# Patient Record
Sex: MALE | Race: WHITE | NOT HISPANIC OR LATINO | Employment: FULL TIME | URBAN - METROPOLITAN AREA
[De-identification: names, ages, dates, MRNs, and addresses within clinical notes are randomized per-mention and may not be internally consistent; named-entity substitution may affect disease eponyms.]

---

## 2017-03-28 ENCOUNTER — HOSPITAL ENCOUNTER (INPATIENT)
Facility: OTHER | Age: 47
LOS: 10 days | Discharge: HOME | DRG: 234 | End: 2017-04-07
Attending: EMERGENCY MEDICINE | Admitting: THORACIC SURGERY (CARDIOTHORACIC VASCULAR SURGERY)
Payer: COMMERCIAL

## 2017-03-28 DIAGNOSIS — I25.10 CORONARY ARTERY DISEASE: ICD-10-CM

## 2017-03-28 DIAGNOSIS — I21.4 NSTEMI (NON-ST ELEVATED MYOCARDIAL INFARCTION): Primary | ICD-10-CM

## 2017-03-28 DIAGNOSIS — E78.00 PURE HYPERCHOLESTEROLEMIA: ICD-10-CM

## 2017-03-28 DIAGNOSIS — I21.4 NON-ST ELEVATION MYOCARDIAL INFARCTION (NSTEMI): ICD-10-CM

## 2017-03-28 DIAGNOSIS — I50.9 HEART FAILURE: ICD-10-CM

## 2017-03-28 DIAGNOSIS — Z95.1 S/P CABG X 2: ICD-10-CM

## 2017-03-28 LAB
ALBUMIN SERPL BCP-MCNC: 4.2 G/DL
ALP SERPL-CCNC: 79 U/L
ALT SERPL W/O P-5'-P-CCNC: 21 U/L
ANION GAP SERPL CALC-SCNC: 10 MMOL/L
AST SERPL-CCNC: 16 U/L
BASOPHILS # BLD AUTO: 0.03 K/UL
BASOPHILS NFR BLD: 0.4 %
BILIRUB SERPL-MCNC: 0.5 MG/DL
BILIRUB UR QL STRIP: NEGATIVE
BUN SERPL-MCNC: 19 MG/DL
CALCIUM SERPL-MCNC: 9.2 MG/DL
CHLORIDE SERPL-SCNC: 106 MMOL/L
CLARITY UR: CLEAR
CO2 SERPL-SCNC: 25 MMOL/L
COLOR UR: YELLOW
CREAT SERPL-MCNC: 1 MG/DL
DIFFERENTIAL METHOD: NORMAL
EOSINOPHIL # BLD AUTO: 0.1 K/UL
EOSINOPHIL NFR BLD: 1.7 %
ERYTHROCYTE [DISTWIDTH] IN BLOOD BY AUTOMATED COUNT: 12.4 %
EST. GFR  (AFRICAN AMERICAN): >60 ML/MIN/1.73 M^2
EST. GFR  (NON AFRICAN AMERICAN): >60 ML/MIN/1.73 M^2
GLUCOSE SERPL-MCNC: 89 MG/DL
GLUCOSE UR QL STRIP: NEGATIVE
HCT VFR BLD AUTO: 46 %
HGB BLD-MCNC: 15.6 G/DL
HGB UR QL STRIP: ABNORMAL
INR PPP: 0.9
KETONES UR QL STRIP: NEGATIVE
LEUKOCYTE ESTERASE UR QL STRIP: NEGATIVE
LYMPHOCYTES # BLD AUTO: 2.8 K/UL
LYMPHOCYTES NFR BLD: 34 %
MCH RBC QN AUTO: 30.6 PG
MCHC RBC AUTO-ENTMCNC: 33.9 %
MCV RBC AUTO: 90 FL
MICROSCOPIC COMMENT: NORMAL
MONOCYTES # BLD AUTO: 0.8 K/UL
MONOCYTES NFR BLD: 9.9 %
NEUTROPHILS # BLD AUTO: 4.4 K/UL
NEUTROPHILS NFR BLD: 53.3 %
NITRITE UR QL STRIP: NEGATIVE
PH UR STRIP: 6 [PH] (ref 5–8)
PLATELET # BLD AUTO: 184 K/UL
PMV BLD AUTO: 10.5 FL
POTASSIUM SERPL-SCNC: 4.4 MMOL/L
PROT SERPL-MCNC: 7.2 G/DL
PROT UR QL STRIP: NEGATIVE
PROTHROMBIN TIME: 10 SEC
RBC # BLD AUTO: 5.1 M/UL
RBC #/AREA URNS HPF: 2 /HPF (ref 0–4)
SODIUM SERPL-SCNC: 141 MMOL/L
SP GR UR STRIP: >=1.03 (ref 1–1.03)
TROPONIN I SERPL DL<=0.01 NG/ML-MCNC: 0.17 NG/ML
URATE SERPL-MCNC: 8.1 MG/DL
URN SPEC COLLECT METH UR: ABNORMAL
UROBILINOGEN UR STRIP-ACNC: NEGATIVE EU/DL
WBC # BLD AUTO: 8.26 K/UL

## 2017-03-28 PROCEDURE — 96372 THER/PROPH/DIAG INJ SC/IM: CPT

## 2017-03-28 PROCEDURE — 93005 ELECTROCARDIOGRAM TRACING: CPT

## 2017-03-28 PROCEDURE — 85025 COMPLETE CBC W/AUTO DIFF WBC: CPT

## 2017-03-28 PROCEDURE — 25000003 PHARM REV CODE 250: Performed by: PHYSICIAN ASSISTANT

## 2017-03-28 PROCEDURE — 93010 ELECTROCARDIOGRAM REPORT: CPT | Mod: ,,, | Performed by: INTERNAL MEDICINE

## 2017-03-28 PROCEDURE — 11000001 HC ACUTE MED/SURG PRIVATE ROOM

## 2017-03-28 PROCEDURE — 99285 EMERGENCY DEPT VISIT HI MDM: CPT | Mod: 25

## 2017-03-28 PROCEDURE — 80053 COMPREHEN METABOLIC PANEL: CPT

## 2017-03-28 PROCEDURE — 85610 PROTHROMBIN TIME: CPT

## 2017-03-28 PROCEDURE — 81000 URINALYSIS NONAUTO W/SCOPE: CPT

## 2017-03-28 PROCEDURE — 84484 ASSAY OF TROPONIN QUANT: CPT

## 2017-03-28 PROCEDURE — 84550 ASSAY OF BLOOD/URIC ACID: CPT

## 2017-03-28 PROCEDURE — 36000 PLACE NEEDLE IN VEIN: CPT

## 2017-03-28 RX ORDER — METOPROLOL TARTRATE 25 MG/1
25 TABLET, FILM COATED ORAL 2 TIMES DAILY
Status: DISCONTINUED | OUTPATIENT
Start: 2017-03-28 | End: 2017-04-03

## 2017-03-28 RX ORDER — NITROGLYCERIN 80 MG/1
1 PATCH TRANSDERMAL DAILY
Status: DISCONTINUED | OUTPATIENT
Start: 2017-03-29 | End: 2017-04-03

## 2017-03-28 RX ORDER — NITROGLYCERIN 0.4 MG/1
0.4 TABLET SUBLINGUAL
Status: COMPLETED | OUTPATIENT
Start: 2017-03-28 | End: 2017-03-29

## 2017-03-28 RX ORDER — CLOPIDOGREL 300 MG/1
600 TABLET, FILM COATED ORAL
Status: COMPLETED | OUTPATIENT
Start: 2017-03-28 | End: 2017-03-29

## 2017-03-28 RX ORDER — ASPIRIN 325 MG
325 TABLET ORAL
Status: COMPLETED | OUTPATIENT
Start: 2017-03-28 | End: 2017-03-28

## 2017-03-28 RX ORDER — ENOXAPARIN SODIUM 100 MG/ML
1 INJECTION SUBCUTANEOUS
Status: COMPLETED | OUTPATIENT
Start: 2017-03-28 | End: 2017-03-29

## 2017-03-28 RX ADMIN — ASPIRIN 325 MG ORAL TABLET 325 MG: 325 PILL ORAL at 08:03

## 2017-03-28 NOTE — IP AVS SNAPSHOT
[facility]  [address]  [facility] [address]  Phone: [phone]           Patient Discharge Instructions   Our goal is to set you up for success. This packet includes information on your condition, medications, and your home care.  It will help you care for yourself to prevent having to return to the hospital.     Please ask your nurse if you have any questions.      There are many details to remember when preparing to leave the hospital. Here is what you will need to do:    1. Take your medicine. If you are prescribed medications, review your Medication List on the following pages. You may have new medications to  at the pharmacy and others that you'll need to stop taking. Review the instructions for how and when to take your medications. Talk with your doctor or nurses if you are unsure of what to do.     2. Go to your follow-up appointments. Specific follow-up information is listed in the following pages. Your may be contacted by a nurse or clinical provider about future appointments. Be sure we have all of the phone numbers to reach you. Please contact your provider's office if you are unable to make an appointment.     3. Watch for warning signs. Your doctor or nurse will give you detailed warning signs to watch for and when to call for assistance. These instructions may also include educational information about your condition. If you experience any of warning signs to your health, call your doctor.           [facility]  Unless otherwise directed by your provider, please   contact [facility], our nurse care line   that is available for 24/7 assistance.     [phone] (toll-free)     Registered nurses in the [facility]   provide: appointment scheduling, clinical advisement, health education, and other advisory services.                  ** Verify the list of medication(s) below is accurate and up to date. Carry this with you in case of  emergency. If your medications have changed, please notify your healthcare provider.             Medication List      START taking these medications        Additional Info    Begin Date AM Noon PM Bedtime    allopurinol 100 MG tablet   Commonly known as:  ZYLOPRIM   Quantity:  60 tablet   Refills:  11   Dose:  100 mg    Last time this was given:  100 mg on 4/7/2017  8:26 AM   Instructions:  Take 1 tablet (100 mg total) by mouth once daily.                               aspirin 325 MG EC tablet   Commonly known as:  ECOTRIN   Refills:  0   Dose:  325 mg    Last time this was given:  325 mg on 4/7/2017  8:26 AM   Instructions:  Take 1 tablet (325 mg total) by mouth once daily.                               clopidogrel 75 mg tablet   Commonly known as:  PLAVIX   Quantity:  60 tablet   Refills:  11   Dose:  75 mg    Last time this was given:  75 mg on 4/7/2017  8:26 AM   Instructions:  Take 1 tablet (75 mg total) by mouth once daily.                               docusate sodium 50 MG capsule   Commonly known as:  COLACE   Refills:  0   Dose:  50 mg    Last time this was given:  50 mg on 4/7/2017  5:39 AM   Instructions:  Take 1 capsule (50 mg total) by mouth 2 (two) times daily as needed for Constipation.                            ezetimibe 10 mg tablet   Commonly known as:  ZETIA   Quantity:  60 tablet   Refills:  2   Dose:  10 mg    Last time this was given:  10 mg on 4/7/2017  8:26 AM   Instructions:  Take 1 tablet (10 mg total) by mouth once daily.                               furosemide 20 MG tablet   Commonly known as:  LASIX   Quantity:  60 tablet   Refills:  11   Dose:  20 mg   Comments:  Take one tablet by mouth twice a day for one week then decrease to one tablet daily    Instructions:  Take 1 tablet (20 mg total) by mouth 2 (two) times daily.                                  metoprolol tartrate 50 MG tablet   Commonly known as:  LOPRESSOR   Quantity:  60 tablet   Refills:  11   Dose:  50 mg    Last time  this was given:  50 mg on 4/7/2017  5:35 AM   Instructions:  Take 1 tablet (50 mg total) by mouth 2 (two) times daily.                                  oxycodone 10 mg Tab immediate release tablet   Commonly known as:  ROXICODONE   Quantity:  60 tablet   Refills:  0   Dose:  10 mg    Last time this was given:  15 mg on 4/6/2017 12:55 AM   Instructions:  Take 1 tablet (10 mg total) by mouth every 4 (four) hours as needed.                            potassium chloride SA 20 MEQ tablet   Commonly known as:  K-DUR,KLOR-CON   Quantity:  60 tablet   Refills:  11   Dose:  20 mEq   Comments:  Take one tablet by mouth twice a day for one week then decrease to one tablet daily    Last time this was given:  20 mEq on 4/7/2017  8:26 AM   Instructions:  Take 1 tablet (20 mEq total) by mouth 2 (two) times daily.                                    CONTINUE taking these medications        Additional Info    Begin Date AM Noon PM Bedtime    rosuvastatin 40 MG Tab   Commonly known as:  CRESTOR   Refills:  0   Dose:  40 mg    Last time this was given:  40 mg on 4/7/2017  8:26 AM   Instructions:  Take 40 mg by mouth once daily.                                    Where to Get Your Medications      These medications were sent to [facility] [address] [address]     Phone:  [phone]     allopurinol 100 MG tablet    clopidogrel 75 mg tablet    ezetimibe 10 mg tablet    furosemide 20 MG tablet    metoprolol tartrate 50 MG tablet    potassium chloride SA 20 MEQ tablet         You can get these medications from any pharmacy     Bring a paper prescription for each of these medications     oxycodone 10 mg Tab immediate release tablet       You don't need a prescription for these medications     aspirin 325 MG EC tablet    docusate sodium 50 MG capsule                  Please bring to all follow up appointments:    1. A copy of your discharge  "instructions.  2. All medicines you are currently taking in their original bottles.  3. Identification and insurance card.    Please arrive 15 minutes ahead of scheduled appointment time.    Please call 24 hours in advance if you must reschedule your appointment and/or time.        Your Scheduled Appointments     Apr 12, 2017  9:45 AM CDT   Diagnostic Xray with [facility] [id number] 485 LB LIMIT   [facility]    [address]  [facility] [address]   [phone]            Apr 12, 2017 10:15 AM CDT   EKG with EKG, APPT   [facility] - EKG ([facility] )    [address]  [place] [id number] [phone]            Apr 12, 2017 10:45 AM CDT   Post OP with Enrrique Allen MD   [facility] - Cardiovascular Surg ([facility] )    [address]  [place] [id number] [phone]                  Primary Diagnosis     Your primary diagnosis was:  History Of Coronary Artery Bypass Graft      Admission Information     Date & Time Provider Department CSN    3/28/2017  8:03 PM Enrrique Allen MD [facility] [id number]      Care Providers     Provider Role Specialty Primary office phone    Enrrique Allen MD Attending Provider Cardiothoracic Surgery [phone]    Michelle Francisco MD Surgeon  Cardiology [phone]    Enrrique Allen MD Surgeon  Cardiothoracic Surgery [phone]      Your Vitals Were     BP Pulse Temp Resp Height Weight    108/63 (BP Location: Left arm, Patient Position: Sitting, BP Method: Automatic) 90 98.4 °F (36.9 °C) (Oral) 18 6' 2" (1.88 m) 101.2 kg (223 lb 1.7 oz)    SpO2 BMI             97% 28.65 kg/m2         Recent Lab Values     No lab values to display.      Allergies as of 4/7/2017        Reactions    Morpholine Analogues Other (See Comments)      Advance Directives     An advance directive is a document which, in the event you are no longer able to make decisions for yourself, " tells your healthcare team what kind of treatment you do or do not want to receive, or who you would like to make those decisions for you.  If you do not currently have an advance directive, [facility] encourages you to create one.  For more information call:  [phone] ([phone]), [phone] ([phone]),  or log on to [email or web address].        Smoking Cessation     If you would like to quit smoking:   You may be eligible for free services if you are a [place] resident and started smoking cigarettes before September 1, 1988.  Call the Smoking Cessation Trust ([facility]) toll free at [phone] or [phone].   Call 6-799-QUIT-NOW if you do not meet the above criteria.   Contact us via email: [email or web address]   View our website for more information: [email or web address]        Language Assistance Services     ATTENTION: Language assistance services are available, free of charge. Please call [phone].      ATENCIÓN: Si habla español, tiene a black disposición servicios gratuitos de asistencia lingüística. Llame al [phone].     CHÚ Ý: N?u b?n nói Ti?ng Vi?t, có các d?ch v? h? tr? ngôn ng? mi?n phí dành cho b?n. G?i s? [phone].        [facility] Sign-Up     Activating your [facility] account is as easy as 1-2-3!     1) Visit [email or web address], select Sign Up Now, enter this activation code and your date of birth, then select Next.  [email or web address]  Expires: 5/20/2017  1:27 PM      2) Create a username and password to use when you visit [facility] in the future and select a security question in case you lose your password and select Next.    3) Enter your e-mail address and click Sign Up!    Additional Information  If you have questions, please e-mail [email or web address] or call [phone] to talk to our [facility] staff. Remember, [facility] is NOT to be used for urgent needs. For medical emergencies, dial 911.          [facility] complies with  applicable Federal civil rights laws and does not discriminate on the basis of race, color, national origin, age, disability, or sex.

## 2017-03-29 PROBLEM — E79.0 HYPERURICEMIA: Status: ACTIVE | Noted: 2017-03-29

## 2017-03-29 PROBLEM — Z72.0 TOBACCO USE: Status: ACTIVE | Noted: 2017-03-29

## 2017-03-29 PROBLEM — N20.0 NEPHROLITHIASIS: Status: ACTIVE | Noted: 2017-03-29

## 2017-03-29 PROBLEM — Z82.49 FAMILY HISTORY OF ISCHEMIC HEART DISEASE: Status: ACTIVE | Noted: 2017-03-29

## 2017-03-29 PROBLEM — E78.00 HYPERCHOLESTEROLEMIA: Status: ACTIVE | Noted: 2017-03-29

## 2017-03-29 PROBLEM — I25.10 CORONARY ARTERY DISEASE: Status: ACTIVE | Noted: 2017-03-29

## 2017-03-29 LAB
CHOLEST/HDLC SERPL: 9.1 {RATIO}
CORONARY STENOSIS: ABNORMAL
DIASTOLIC DYSFUNCTION: NO
GLOBAL PERICARDIAL EFFUSION: NORMAL
HDL/CHOLESTEROL RATIO: 11 %
HDLC SERPL-MCNC: 381 MG/DL
HDLC SERPL-MCNC: 42 MG/DL
LDLC SERPL CALC-MCNC: 313.4 MG/DL
NONHDLC SERPL-MCNC: 339 MG/DL
RETIRED EF AND QEF - SEE NOTES: 50 (ref 55–65)
TRIGL SERPL-MCNC: 128 MG/DL
TROPONIN I SERPL DL<=0.01 NG/ML-MCNC: 0.13 NG/ML
TROPONIN I SERPL DL<=0.01 NG/ML-MCNC: 0.15 NG/ML

## 2017-03-29 PROCEDURE — 11000001 HC ACUTE MED/SURG PRIVATE ROOM

## 2017-03-29 PROCEDURE — 63600175 PHARM REV CODE 636 W HCPCS: Performed by: PHYSICIAN ASSISTANT

## 2017-03-29 PROCEDURE — 80061 LIPID PANEL: CPT

## 2017-03-29 PROCEDURE — 63600175 PHARM REV CODE 636 W HCPCS

## 2017-03-29 PROCEDURE — 84484 ASSAY OF TROPONIN QUANT: CPT | Mod: 91

## 2017-03-29 PROCEDURE — 25000003 PHARM REV CODE 250

## 2017-03-29 PROCEDURE — 27201224 CATH LAB PROCEDURE

## 2017-03-29 PROCEDURE — 25000003 PHARM REV CODE 250: Performed by: INTERNAL MEDICINE

## 2017-03-29 PROCEDURE — 25000003 PHARM REV CODE 250: Performed by: PHYSICIAN ASSISTANT

## 2017-03-29 PROCEDURE — 84484 ASSAY OF TROPONIN QUANT: CPT

## 2017-03-29 PROCEDURE — 25500020 PHARM REV CODE 255

## 2017-03-29 PROCEDURE — B2111ZZ FLUOROSCOPY OF MULTIPLE CORONARY ARTERIES USING LOW OSMOLAR CONTRAST: ICD-10-PCS | Performed by: INTERNAL MEDICINE

## 2017-03-29 PROCEDURE — 93306 TTE W/DOPPLER COMPLETE: CPT

## 2017-03-29 PROCEDURE — 4A023N7 MEASUREMENT OF CARDIAC SAMPLING AND PRESSURE, LEFT HEART, PERCUTANEOUS APPROACH: ICD-10-PCS | Performed by: INTERNAL MEDICINE

## 2017-03-29 PROCEDURE — 36415 COLL VENOUS BLD VENIPUNCTURE: CPT

## 2017-03-29 PROCEDURE — B2151ZZ FLUOROSCOPY OF LEFT HEART USING LOW OSMOLAR CONTRAST: ICD-10-PCS | Performed by: INTERNAL MEDICINE

## 2017-03-29 RX ORDER — NITROGLYCERIN 0.4 MG/1
TABLET SUBLINGUAL
Status: COMPLETED
Start: 2017-03-29 | End: 2017-03-29

## 2017-03-29 RX ORDER — HYDROCODONE BITARTRATE AND ACETAMINOPHEN 5; 325 MG/1; MG/1
1 TABLET ORAL EVERY 4 HOURS PRN
Status: DISCONTINUED | OUTPATIENT
Start: 2017-03-29 | End: 2017-03-30

## 2017-03-29 RX ORDER — ACETAMINOPHEN 325 MG/1
650 TABLET ORAL EVERY 4 HOURS PRN
Status: DISCONTINUED | OUTPATIENT
Start: 2017-03-29 | End: 2017-03-30

## 2017-03-29 RX ORDER — ROSUVASTATIN CALCIUM 40 MG/1
40 TABLET, COATED ORAL DAILY
COMMUNITY

## 2017-03-29 RX ORDER — SODIUM CHLORIDE 9 MG/ML
INJECTION, SOLUTION INTRAVENOUS CONTINUOUS
Status: DISCONTINUED | OUTPATIENT
Start: 2017-03-29 | End: 2017-03-29

## 2017-03-29 RX ORDER — DIPHENHYDRAMINE HCL 25 MG
25 CAPSULE ORAL
Status: DISCONTINUED | OUTPATIENT
Start: 2017-03-29 | End: 2017-03-29

## 2017-03-29 RX ORDER — SODIUM CHLORIDE 9 MG/ML
INJECTION, SOLUTION INTRAVENOUS CONTINUOUS
Status: DISCONTINUED | OUTPATIENT
Start: 2017-03-29 | End: 2017-03-30

## 2017-03-29 RX ORDER — ZOLPIDEM TARTRATE 5 MG/1
5 TABLET ORAL NIGHTLY PRN
Status: DISCONTINUED | OUTPATIENT
Start: 2017-03-29 | End: 2017-04-03

## 2017-03-29 RX ORDER — ROSUVASTATIN CALCIUM 20 MG/1
40 TABLET, COATED ORAL DAILY
Status: DISCONTINUED | OUTPATIENT
Start: 2017-03-29 | End: 2017-04-07 | Stop reason: HOSPADM

## 2017-03-29 RX ORDER — CLOPIDOGREL BISULFATE 75 MG/1
75 TABLET ORAL DAILY
Status: DISCONTINUED | OUTPATIENT
Start: 2017-03-30 | End: 2017-03-29

## 2017-03-29 RX ORDER — ASPIRIN 81 MG/1
81 TABLET ORAL DAILY
Status: DISCONTINUED | OUTPATIENT
Start: 2017-03-29 | End: 2017-04-03

## 2017-03-29 RX ORDER — NITROGLYCERIN 0.4 MG/1
0.4 TABLET SUBLINGUAL EVERY 5 MIN PRN
Status: DISCONTINUED | OUTPATIENT
Start: 2017-03-29 | End: 2017-03-30

## 2017-03-29 RX ORDER — SODIUM CHLORIDE 0.9 % (FLUSH) 0.9 %
3 SYRINGE (ML) INJECTION EVERY 8 HOURS
Status: DISCONTINUED | OUTPATIENT
Start: 2017-03-29 | End: 2017-03-31

## 2017-03-29 RX ADMIN — HYDROCODONE BITARTATE AND ACETAMINOPHEN 1 TABLET: 5; 325 TABLET ORAL at 09:03

## 2017-03-29 RX ADMIN — METOPROLOL TARTRATE 25 MG: 25 TABLET, FILM COATED ORAL at 08:03

## 2017-03-29 RX ADMIN — NITROGLYCERIN 0.4 MG: 0.4 TABLET SUBLINGUAL at 06:03

## 2017-03-29 RX ADMIN — NITROGLYCERIN 0.4 MG: 0.4 TABLET SUBLINGUAL at 12:03

## 2017-03-29 RX ADMIN — CLOPIDOGREL BISULFATE 600 MG: 300 TABLET, FILM COATED ORAL at 12:03

## 2017-03-29 RX ADMIN — ASPIRIN 81 MG: 81 TABLET, COATED ORAL at 08:03

## 2017-03-29 RX ADMIN — ROSUVASTATIN CALCIUM 40 MG: 20 TABLET, FILM COATED ORAL at 08:03

## 2017-03-29 RX ADMIN — ENOXAPARIN SODIUM 100 MG: 100 INJECTION SUBCUTANEOUS at 12:03

## 2017-03-29 RX ADMIN — SODIUM CHLORIDE: 0.9 INJECTION, SOLUTION INTRAVENOUS at 08:03

## 2017-03-29 RX ADMIN — METOPROLOL TARTRATE 25 MG: 25 TABLET, FILM COATED ORAL at 09:03

## 2017-03-29 RX ADMIN — SODIUM CHLORIDE: 0.9 INJECTION, SOLUTION INTRAVENOUS at 09:03

## 2017-03-29 RX ADMIN — NITROGLYCERIN 0.4 MG: 0.4 TABLET SUBLINGUAL at 10:03

## 2017-03-29 RX ADMIN — METOPROLOL TARTRATE 25 MG: 25 TABLET, FILM COATED ORAL at 01:03

## 2017-03-29 RX ADMIN — NITROGLYCERIN 1 PATCH: 0.4 PATCH TRANSDERMAL at 08:03

## 2017-03-29 RX ADMIN — SODIUM CHLORIDE, PRESERVATIVE FREE 3 ML: 5 INJECTION INTRAVENOUS at 09:03

## 2017-03-29 NOTE — PLAN OF CARE
Problem: Patient Care Overview  Goal: Plan of Care Review  Outcome: Ongoing (interventions implemented as appropriate)  Patient remains free from injury or falls. Vital signs stable throughout night on room air. Positions self independently. Pain managed with IV and PO medications. Telemetry maintained. Bed in low locked position and call light within reach. Will continue to monitor.

## 2017-03-29 NOTE — ED TRIAGE NOTES
Pt reports to ED c/o 8/10 intermittent midsternal chest pain x 2-3 weeks worsening with exertion with radiation to upper extremities; denies SOB, diaphoresis, N/V/D, fevers, chills. Pt recently seen in ED for kidney stones, denies any worsening flank pain, dysuria/hematuria.

## 2017-03-29 NOTE — PLAN OF CARE
Problem: Patient Care Overview  Goal: Plan of Care Review  Outcome: Ongoing (interventions implemented as appropriate)  Pt arrived from ED via wheelchair. No c/o pain. Independent to bathroom. Telemetry initiated. Patient remains free from injury or falls. Vital signs stable throughout night on room air. Pt refused SCDs; education provided. Bed in low locked position and call light within reach. Will continue to monitor.

## 2017-03-29 NOTE — OR NURSING
Patient c/o chest pain.pressure  2-3/10.  Dr. Francisco notified. Orders received for NTG SL and initiated.

## 2017-03-29 NOTE — BRIEF OP NOTE
3/29/2017: [facility]: Cath: LAD: Prox 95%. Mid: Occlusion. Wrap around. LCX: OM1 90%. RCA: Distal 80% segmental. LV: Anteroseptal mild hypokinesia. EF 50%.    Michelle Francisco M.D.

## 2017-03-29 NOTE — SUBJECTIVE & OBJECTIVE
Past Medical History:   Diagnosis Date    Gout     Kidney stones        Past Surgical History:   Procedure Laterality Date    LITHOTRIPSY         Review of patient's allergies indicates:   Allergen Reactions    Morpholine analogues Other (See Comments)       No current facility-administered medications on file prior to encounter.      No current outpatient prescriptions on file prior to encounter.     Family History     None        Social History Main Topics    Smoking status: Current Every Day Smoker     Packs/day: 0.25     Start date: 1/1/1990    Smokeless tobacco: Never Used    Alcohol use Yes    Drug use: No    Sexual activity: Not on file     Review of Systems   Constitution: Negative for chills, fever, weakness and malaise/fatigue.   HENT: Negative for headaches and nosebleeds.    Eyes: Negative for double vision, vision loss in left eye and vision loss in right eye.   Cardiovascular: Positive for chest pain. Negative for claudication, dyspnea on exertion, irregular heartbeat, leg swelling, near-syncope, orthopnea, palpitations, paroxysmal nocturnal dyspnea and syncope.   Respiratory: Negative for cough, hemoptysis, shortness of breath and wheezing.    Endocrine: Negative for cold intolerance and heat intolerance.   Hematologic/Lymphatic: Negative for bleeding problem. Does not bruise/bleed easily.   Skin: Negative for color change and rash.   Musculoskeletal: Negative for back pain, falls, muscle weakness and myalgias.   Gastrointestinal: Negative for heartburn, hematemesis, hematochezia, hemorrhoids, jaundice, melena, nausea and vomiting.   Genitourinary: Negative for dysuria and hematuria.   Neurological: Negative for dizziness, focal weakness, light-headedness, loss of balance, numbness and vertigo.   Psychiatric/Behavioral: Negative for altered mental status, depression and memory loss. The patient is not nervous/anxious.    Allergic/Immunologic: Negative for hives and persistent infections.      Objective:     Vital Signs (Most Recent):  Temp: 97.9 °F (36.6 °C) (03/29/17 0441)  Pulse: (!) 56 (03/29/17 0600)  Resp: 14 (03/29/17 0209)  BP: 122/72 (03/29/17 0441)  SpO2: 98 % (03/29/17 0441) Vital Signs (24h Range):  Temp:  [97.8 °F (36.6 °C)-98.1 °F (36.7 °C)] 97.9 °F (36.6 °C)  Pulse:  [56-89] 56  Resp:  [10-18] 14  SpO2:  [95 %-98 %] 98 %  BP: (119-146)/(67-90) 122/72     Weight: 104.3 kg (230 lb)  Body mass index is 29.53 kg/(m^2).    SpO2: 98 %  O2 Device (Oxygen Therapy): room air    No intake or output data in the 24 hours ending 03/29/17 0710    Lines/Drains/Airways     Peripheral Intravenous Line                 Peripheral IV - Single Lumen 03/28/17 2038 Left Antecubital less than 1 day                Physical Exam   Constitutional: He is oriented to person, place, and time. He appears well-developed and well-nourished.  Non-toxic appearance. No distress.   HENT:   Head: Normocephalic and atraumatic.   Nose: Nose normal.   Eyes: Right eye exhibits no discharge. Left eye exhibits no discharge. Right conjunctiva is not injected. Left conjunctiva is not injected. Right pupil is round. Left pupil is round. Pupils are equal.   Neck: Neck supple. No JVD present. Carotid bruit is not present. No thyromegaly present.   Cardiovascular: Normal rate, regular rhythm, S1 normal and S2 normal.   No extrasystoles are present. PMI is not displaced.  Exam reveals no gallop.    Pulses:       Radial pulses are 2+ on the right side, and 2+ on the left side.        Femoral pulses are 2+ on the right side, and 2+ on the left side.       Dorsalis pedis pulses are 2+ on the right side, and 2+ on the left side.        Posterior tibial pulses are 2+ on the right side, and 2+ on the left side.   Pulmonary/Chest: Effort normal and breath sounds normal.   Abdominal: Soft. Normal appearance. There is no hepatosplenomegaly. There is no tenderness.   Musculoskeletal:        Right ankle: He exhibits no swelling, no ecchymosis and  no deformity.        Left ankle: He exhibits no swelling, no ecchymosis and no deformity.   Lymphadenopathy:        Head (right side): No submandibular adenopathy present.        Head (left side): No submandibular adenopathy present.     He has no cervical adenopathy.   Neurological: He is alert and oriented to person, place, and time. He is not disoriented. No cranial nerve deficit.   Skin: Skin is warm, dry and intact. No rash noted. He is not diaphoretic. No cyanosis. No pallor. Nails show no clubbing.   Psychiatric: He has a normal mood and affect. His speech is normal and behavior is normal. Judgment and thought content normal. Cognition and memory are normal.       Current Medications:     metoprolol tartrate  25 mg Oral BID    nitroGLYCERIN 0.4 MG/HR TD PT24  1 patch Transdermal Daily     Current Laboratory Results:    Recent Results (from the past 24 hour(s))   Urinalysis Clean Catch    Collection Time: 03/28/17  8:12 PM   Result Value Ref Range    Specimen UA Urine, Clean Catch     Color, UA Yellow Yellow, Straw, Yasmin    Appearance, UA Clear Clear    pH, UA 6.0 5.0 - 8.0    Specific Gravity, UA >=1.030 (A) 1.005 - 1.030    Protein, UA Negative Negative    Glucose, UA Negative Negative    Ketones, UA Negative Negative    Bilirubin (UA) Negative Negative    Occult Blood UA 2+ (A) Negative    Nitrite, UA Negative Negative    Urobilinogen, UA Negative <2.0 EU/dL    Leukocytes, UA Negative Negative   Urinalysis Microscopic    Collection Time: 03/28/17  8:12 PM   Result Value Ref Range    RBC, UA 2 0 - 4 /hpf    Microscopic Comment SEE COMMENT    CBC auto differential    Collection Time: 03/28/17  8:43 PM   Result Value Ref Range    WBC 8.26 3.90 - 12.70 K/uL    RBC 5.10 4.60 - 6.20 M/uL    Hemoglobin 15.6 14.0 - 18.0 g/dL    Hematocrit 46.0 40.0 - 54.0 %    MCV 90 82 - 98 fL    MCH 30.6 27.0 - 31.0 pg    MCHC 33.9 32.0 - 36.0 %    RDW 12.4 11.5 - 14.5 %    Platelets 184 150 - 350 K/uL    MPV 10.5 9.2 - 12.9  fL    Gran # 4.4 1.8 - 7.7 K/uL    Lymph # 2.8 1.0 - 4.8 K/uL    Mono # 0.8 0.3 - 1.0 K/uL    Eos # 0.1 0.0 - 0.5 K/uL    Baso # 0.03 0.00 - 0.20 K/uL    Gran% 53.3 38.0 - 73.0 %    Lymph% 34.0 18.0 - 48.0 %    Mono% 9.9 4.0 - 15.0 %    Eosinophil% 1.7 0.0 - 8.0 %    Basophil% 0.4 0.0 - 1.9 %    Differential Method Automated    Comprehensive metabolic panel    Collection Time: 03/28/17  8:43 PM   Result Value Ref Range    Sodium 141 136 - 145 mmol/L    Potassium 4.4 3.5 - 5.1 mmol/L    Chloride 106 95 - 110 mmol/L    CO2 25 23 - 29 mmol/L    Glucose 89 70 - 110 mg/dL    BUN, Bld 19 6 - 20 mg/dL    Creatinine 1.0 0.5 - 1.4 mg/dL    Calcium 9.2 8.7 - 10.5 mg/dL    Total Protein 7.2 6.0 - 8.4 g/dL    Albumin 4.2 3.5 - 5.2 g/dL    Total Bilirubin 0.5 0.1 - 1.0 mg/dL    Alkaline Phosphatase 79 55 - 135 U/L    AST 16 10 - 40 U/L    ALT 21 10 - 44 U/L    Anion Gap 10 8 - 16 mmol/L    eGFR if African American >60 >60 mL/min/1.73 m^2    eGFR if non African American >60 >60 mL/min/1.73 m^2   Protime-INR    Collection Time: 03/28/17  8:43 PM   Result Value Ref Range    Prothrombin Time 10.0 9.0 - 12.5 sec    INR 0.9 0.8 - 1.2   Troponin I    Collection Time: 03/28/17  8:43 PM   Result Value Ref Range    Troponin I 0.171 (H) 0.000 - 0.026 ng/mL   Uric acid    Collection Time: 03/28/17  8:43 PM   Result Value Ref Range    Uric Acid 8.1 (H) 3.4 - 7.0 mg/dL   Troponin I    Collection Time: 03/29/17 12:58 AM   Result Value Ref Range    Troponin I 0.148 (H) 0.000 - 0.026 ng/mL   Troponin I    Collection Time: 03/29/17 12:58 AM   Result Value Ref Range    Troponin I 0.148 (H) 0.000 - 0.026 ng/mL   Troponin I    Collection Time: 03/29/17 12:58 AM   Result Value Ref Range    Troponin I 0.148 (H) 0.000 - 0.026 ng/mL     Current Imaging Results:    Imaging Results         X-Ray Chest PA And Lateral (Final result) Result time:  03/28/17 20:49:52    Final result by Carlo Martinez MD (03/28/17 20:49:52)    Impression:        No  radiographic acute intrathoracic process seen.      Electronically signed by: BRADLY FLORIAN MD, MD  Date:     03/28/17  Time:    20:49     Narrative:    COMPARISON: None    FINDINGS: Two views of the chest. EKG stickers overlie the chest.   Pulmonary vasculature and hilar regions are within normal limits.  The bilateral lungs are well expanded and clear.  No pleural effusion or pneumothorax.  The heart and mediastinal contours are within normal limits for age.  Included osseous structures show mild degenerative change without acute or destructive process seen.              ECG: NSR. Normal ECG.

## 2017-03-29 NOTE — PLAN OF CARE
SW met with pt bedside to complete discharge assessment.  Pt is from [place] and in [place] for convention.  Pt will return home to [place], [place] upon discharge.  No family in town but mother, Sallie [phone], is willing to come to [place] if needed.  No needs identified at this time.     03/29/17 1354   Discharge Assessment   Assessment Type Discharge Planning Assessment   Confirmed/corrected address and phone number on facesheet? Yes   Assessment information obtained from? Patient   Prior to hospitilization cognitive status: Alert/Oriented   Prior to hospitalization functional status: Independent   Current cognitive status: Alert/Oriented   Current Functional Status: Independent   Lives With alone   Able to Return to Prior Arrangements yes   Is patient able to care for self after discharge? Yes   Patient's perception of discharge disposition home or selfcare   Patient currently being followed by outpatient case management? No   Patient currently receives home health services? No   Does the patient currently use HME? No   Patient currently receives private duty nursing? No   Patient currently receives any other outside agency services? No   Do you have any problems affording any of your prescribed medications? No   Is the patient taking medications as prescribed? yes   Do you have any financial concerns preventing you from receiving the healthcare you need? No   Does the patient have transportation to healthcare appointments? Yes   Transportation Available (personal transportation)   On Dialysis? No   Does the patient receive services at the Coumadin Clinic? No   Are there any open cases? No   Discharge Plan A Home   Patient/Family In Agreement With Plan yes

## 2017-03-29 NOTE — H&P
[facility]  Cardiology  History and Physical     Patient Name: Se Denson  MRN: [id number]  Admission Date: 3/28/2017  Code Status: Full Code   Attending Provider: Michelle Francisco MD   Primary Care Physician: Primary Doctor No  Principal Problem:Non-ST elevation myocardial infarction (NSTEMI)    Patient information was obtained from patient and ER records.     Subjective:     Chief Complaint:  Chest Pain    HPI:  45 yo male with hypercholesterolemia who smokes. He has a positive family history for ischemic heart disease in that his mother was having an intervention procedure at age 38. He began experience chest tightness with exertion about 3/10/2017. The pain would come on when on an elliptical machine. Since then progression with chest tightness with minimal exertion and on 3/28/2017 mild chest discomfort at rest. The pain was radiating out to his arms. He received sl NTG in the ER with resolution of pain. Presents to the ER and admitted.    Past Medical History:   Diagnosis Date    Gout     Kidney stones        Past Surgical History:   Procedure Laterality Date    LITHOTRIPSY         Review of patient's allergies indicates:   Allergen Reactions    Morpholine analogues Other (See Comments)       No current facility-administered medications on file prior to encounter.      No current outpatient prescriptions on file prior to encounter.     Family History     None        Social History Main Topics    Smoking status: Current Every Day Smoker     Packs/day: 0.25     Start date: 1/1/1990    Smokeless tobacco: Never Used    Alcohol use Yes    Drug use: No    Sexual activity: Not on file     Review of Systems   Constitution: Negative for chills, fever, weakness and malaise/fatigue.   HENT: Negative for headaches and nosebleeds.    Eyes: Negative for double vision, vision loss in left eye and vision loss in right eye.   Cardiovascular: Positive for chest pain. Negative for  claudication, dyspnea on exertion, irregular heartbeat, leg swelling, near-syncope, orthopnea, palpitations, paroxysmal nocturnal dyspnea and syncope.   Respiratory: Negative for cough, hemoptysis, shortness of breath and wheezing.    Endocrine: Negative for cold intolerance and heat intolerance.   Hematologic/Lymphatic: Negative for bleeding problem. Does not bruise/bleed easily.   Skin: Negative for color change and rash.   Musculoskeletal: Negative for back pain, falls, muscle weakness and myalgias.   Gastrointestinal: Negative for heartburn, hematemesis, hematochezia, hemorrhoids, jaundice, melena, nausea and vomiting.   Genitourinary: Negative for dysuria and hematuria.   Neurological: Negative for dizziness, focal weakness, light-headedness, loss of balance, numbness and vertigo.   Psychiatric/Behavioral: Negative for altered mental status, depression and memory loss. The patient is not nervous/anxious.    Allergic/Immunologic: Negative for hives and persistent infections.     Objective:     Vital Signs (Most Recent):  Temp: 97.9 °F (36.6 °C) (03/29/17 0441)  Pulse: (!) 56 (03/29/17 0600)  Resp: 14 (03/29/17 0209)  BP: 122/72 (03/29/17 0441)  SpO2: 98 % (03/29/17 0441) Vital Signs (24h Range):  Temp:  [97.8 °F (36.6 °C)-98.1 °F (36.7 °C)] 97.9 °F (36.6 °C)  Pulse:  [56-89] 56  Resp:  [10-18] 14  SpO2:  [95 %-98 %] 98 %  BP: (119-146)/(67-90) 122/72     Weight: 104.3 kg (230 lb)  Body mass index is 29.53 kg/(m^2).    SpO2: 98 %  O2 Device (Oxygen Therapy): room air    No intake or output data in the 24 hours ending 03/29/17 0710    Lines/Drains/Airways     Peripheral Intravenous Line                 Peripheral IV - Single Lumen 03/28/17 2038 Left Antecubital less than 1 day                Physical Exam   Constitutional: He is oriented to person, place, and time. He appears well-developed and well-nourished.  Non-toxic appearance. No distress.   HENT:   Head: Normocephalic and atraumatic.   Nose: Nose normal.    Eyes: Right eye exhibits no discharge. Left eye exhibits no discharge. Right conjunctiva is not injected. Left conjunctiva is not injected. Right pupil is round. Left pupil is round. Pupils are equal.   Neck: Neck supple. No JVD present. Carotid bruit is not present. No thyromegaly present.   Cardiovascular: Normal rate, regular rhythm, S1 normal and S2 normal.   No extrasystoles are present. PMI is not displaced.  Exam reveals no gallop.    Pulses:       Radial pulses are 2+ on the right side, and 2+ on the left side.        Femoral pulses are 2+ on the right side, and 2+ on the left side.       Dorsalis pedis pulses are 2+ on the right side, and 2+ on the left side.        Posterior tibial pulses are 2+ on the right side, and 2+ on the left side.   Pulmonary/Chest: Effort normal and breath sounds normal.   Abdominal: Soft. Normal appearance. There is no hepatosplenomegaly. There is no tenderness.   Musculoskeletal:        Right ankle: He exhibits no swelling, no ecchymosis and no deformity.        Left ankle: He exhibits no swelling, no ecchymosis and no deformity.   Lymphadenopathy:        Head (right side): No submandibular adenopathy present.        Head (left side): No submandibular adenopathy present.     He has no cervical adenopathy.   Neurological: He is alert and oriented to person, place, and time. He is not disoriented. No cranial nerve deficit.   Skin: Skin is warm, dry and intact. No rash noted. He is not diaphoretic. No cyanosis. No pallor. Nails show no clubbing.   Psychiatric: He has a normal mood and affect. His speech is normal and behavior is normal. Judgment and thought content normal. Cognition and memory are normal.       Current Medications:     metoprolol tartrate  25 mg Oral BID    nitroGLYCERIN 0.4 MG/HR TD PT24  1 patch Transdermal Daily     Current Laboratory Results:    Recent Results (from the past 24 hour(s))   Urinalysis Clean Catch    Collection Time: 03/28/17  8:12 PM   Result  Value Ref Range    Specimen UA Urine, Clean Catch     Color, UA Yellow Yellow, Straw, Yasmin    Appearance, UA Clear Clear    pH, UA 6.0 5.0 - 8.0    Specific Gravity, UA >=1.030 (A) 1.005 - 1.030    Protein, UA Negative Negative    Glucose, UA Negative Negative    Ketones, UA Negative Negative    Bilirubin (UA) Negative Negative    Occult Blood UA 2+ (A) Negative    Nitrite, UA Negative Negative    Urobilinogen, UA Negative <2.0 EU/dL    Leukocytes, UA Negative Negative   Urinalysis Microscopic    Collection Time: 03/28/17  8:12 PM   Result Value Ref Range    RBC, UA 2 0 - 4 /hpf    Microscopic Comment SEE COMMENT    CBC auto differential    Collection Time: 03/28/17  8:43 PM   Result Value Ref Range    WBC 8.26 3.90 - 12.70 K/uL    RBC 5.10 4.60 - 6.20 M/uL    Hemoglobin 15.6 14.0 - 18.0 g/dL    Hematocrit 46.0 40.0 - 54.0 %    MCV 90 82 - 98 fL    MCH 30.6 27.0 - 31.0 pg    MCHC 33.9 32.0 - 36.0 %    RDW 12.4 11.5 - 14.5 %    Platelets 184 150 - 350 K/uL    MPV 10.5 9.2 - 12.9 fL    Gran # 4.4 1.8 - 7.7 K/uL    Lymph # 2.8 1.0 - 4.8 K/uL    Mono # 0.8 0.3 - 1.0 K/uL    Eos # 0.1 0.0 - 0.5 K/uL    Baso # 0.03 0.00 - 0.20 K/uL    Gran% 53.3 38.0 - 73.0 %    Lymph% 34.0 18.0 - 48.0 %    Mono% 9.9 4.0 - 15.0 %    Eosinophil% 1.7 0.0 - 8.0 %    Basophil% 0.4 0.0 - 1.9 %    Differential Method Automated    Comprehensive metabolic panel    Collection Time: 03/28/17  8:43 PM   Result Value Ref Range    Sodium 141 136 - 145 mmol/L    Potassium 4.4 3.5 - 5.1 mmol/L    Chloride 106 95 - 110 mmol/L    CO2 25 23 - 29 mmol/L    Glucose 89 70 - 110 mg/dL    BUN, Bld 19 6 - 20 mg/dL    Creatinine 1.0 0.5 - 1.4 mg/dL    Calcium 9.2 8.7 - 10.5 mg/dL    Total Protein 7.2 6.0 - 8.4 g/dL    Albumin 4.2 3.5 - 5.2 g/dL    Total Bilirubin 0.5 0.1 - 1.0 mg/dL    Alkaline Phosphatase 79 55 - 135 U/L    AST 16 10 - 40 U/L    ALT 21 10 - 44 U/L    Anion Gap 10 8 - 16 mmol/L    eGFR if African American >60 >60 mL/min/1.73 m^2    eGFR if non  African American >60 >60 mL/min/1.73 m^2   Protime-INR    Collection Time: 03/28/17  8:43 PM   Result Value Ref Range    Prothrombin Time 10.0 9.0 - 12.5 sec    INR 0.9 0.8 - 1.2   Troponin I    Collection Time: 03/28/17  8:43 PM   Result Value Ref Range    Troponin I 0.171 (H) 0.000 - 0.026 ng/mL   Uric acid    Collection Time: 03/28/17  8:43 PM   Result Value Ref Range    Uric Acid 8.1 (H) 3.4 - 7.0 mg/dL   Troponin I    Collection Time: 03/29/17 12:58 AM   Result Value Ref Range    Troponin I 0.148 (H) 0.000 - 0.026 ng/mL   Troponin I    Collection Time: 03/29/17 12:58 AM   Result Value Ref Range    Troponin I 0.148 (H) 0.000 - 0.026 ng/mL   Troponin I    Collection Time: 03/29/17 12:58 AM   Result Value Ref Range    Troponin I 0.148 (H) 0.000 - 0.026 ng/mL     Current Imaging Results:    Imaging Results         X-Ray Chest PA And Lateral (Final result) Result time:  03/28/17 20:49:52    Final result by Bradly Martinez MD (03/28/17 20:49:52)    Impression:        No radiographic acute intrathoracic process seen.      Electronically signed by: BRADLY MARTINEZ MD, MD  Date:     03/28/17  Time:    20:49     Narrative:    COMPARISON: None    FINDINGS: Two views of the chest. EKG stickers overlie the chest.   Pulmonary vasculature and hilar regions are within normal limits.  The bilateral lungs are well expanded and clear.  No pleural effusion or pneumothorax.  The heart and mediastinal contours are within normal limits for age.  Included osseous structures show mild degenerative change without acute or destructive process seen.              ECG: NSR. Normal ECG.    Assessment and Plan:     1. Coronary Artery Disease   3/28/2017: NSTEMI. Troponin 0.2.    Aspirin and clopidogrel.   Metoprolol and NTG patch.   Received enoxaparin.   Cath today.    2. Hypercholesterolemia   Check lipid panel.   Begin atorvastatin 80 mg Q24.    3. Tobacco Use   1990: Began smoking.   Unable to quit.   Strongly advised to quit.    4. Family  History for Ischemic Heart Disease   Mother received coronary stent at age 38.    5. Nephrolithiasis   2016: Diagnosed.   Followed in [place].    6. Hyperuricemia   2016: Diagnosed.   Followed in [place].    The planned procedure was discussed in detail with the patient and the family members present. Risk, benefit and alternatives were reviewed. All questions answered. Consent was then signed.    If further questions or concerns arise the patient was encouraged to contact me prior to the planned procedure.         VTE Risk Mitigation         Ordered     Medium Risk of VTE  Once      03/28/17 2310     Place sequential compression device  Until discontinued      03/28/17 2310          Michelle Francisco MD  Cardiology   [facility]

## 2017-03-29 NOTE — ED PROVIDER NOTES
"Encounter Date: 3/28/2017       History     Chief Complaint   Patient presents with    Chest Pain     pt reports intermittent chest pain for the past 2 weeks; pain radiates into both arms; denies any diaphoresis    Abdominal Pain     reports history of kidney stones with bilateral kidney pain that started also about 2 weeks ago; denies any N/V/D; denies any dysuria/hematuria at this time     Review of patient's allergies indicates:  No Known Allergies  HPI Comments: 46-year-old male with history of gout, kidney stones and hyperlipidemia presents to the emergency department with complaints of intermittent midsternal chest pain that radiates up to both arms.  He describes it as "constricting."  He reports diaphoresis and palpitations when pain is severe.  He states that it is worse with exercise.  He admits that he was recently diagnosed with kidney stones approximately 2 weeks ago.  He denies any nausea, vomiting or diarrhea.  He denies any urinary symptoms.  He states that he is "passing them"  due to increased pain to bilateral flanks.  He states that he is concerned that the pain he is feeling distressed secondary to high uric acid levels.  He states that he had elevated uric acid levels in the past and he had similar pain.  He admits to being a current every day smoker.  He does admit to family history of cardiac disease.    The history is provided by the patient.     Past Medical History:   Diagnosis Date    Gout     Kidney stones      Past Surgical History:   Procedure Laterality Date    LITHOTRIPSY       History reviewed. No pertinent family history.  Social History   Substance Use Topics    Smoking status: Current Every Day Smoker    Smokeless tobacco: None    Alcohol use Yes     Review of Systems   Constitutional: Positive for diaphoresis. Negative for chills and fever.   HENT: Negative for sore throat.    Eyes: Negative for visual disturbance.   Respiratory: Positive for chest tightness. Negative " for shortness of breath.    Cardiovascular: Positive for chest pain and palpitations.   Gastrointestinal: Negative for nausea and vomiting.   Genitourinary: Positive for flank pain. Negative for dysuria.   Musculoskeletal: Positive for arthralgias (arm pain). Negative for back pain, neck pain and neck stiffness.   Skin: Negative for rash.   Neurological: Negative for weakness and numbness.   Hematological: Does not bruise/bleed easily.   Psychiatric/Behavioral: Negative for confusion.       Physical Exam   Initial Vitals   BP Pulse Resp Temp SpO2   03/28/17 1816 03/28/17 1816 03/28/17 1816 03/28/17 1816 03/28/17 1816   130/90 89 18 98.1 °F (36.7 °C) 96 %     Physical Exam    Nursing note and vitals reviewed.  Constitutional: He appears well-developed and well-nourished. He is not diaphoretic.  Non-toxic appearance. No distress.   HENT:   Head: Normocephalic and atraumatic.   Right Ear: External ear normal.   Left Ear: External ear normal.   Nose: Nose normal.   Mouth/Throat: Oropharynx is clear and moist.   Eyes: Conjunctivae, EOM and lids are normal. Pupils are equal, round, and reactive to light. No scleral icterus.   Neck: Normal range of motion and phonation normal. Neck supple. No spinous process tenderness and no muscular tenderness present. Normal range of motion present. No rigidity. Carotid bruit is not present. No JVD present.   Cardiovascular: Normal rate, regular rhythm, normal heart sounds, intact distal pulses and normal pulses. Exam reveals no gallop, no friction rub and no decreased pulses.    No murmur heard.  Pulses:       Radial pulses are 2+ on the right side, and 2+ on the left side.        Dorsalis pedis pulses are 2+ on the right side, and 2+ on the left side.   Pulmonary/Chest: Effort normal and breath sounds normal. No respiratory distress. He has no decreased breath sounds. He has no wheezes. He has no rhonchi. He has no rales.   Abdominal: Soft. Normal appearance and bowel sounds are  normal. There is no tenderness. There is no rebound and no guarding.   Musculoskeletal: Normal range of motion.   No obvious deformities, moving all extremities, normal gait   Neurological: He is alert and oriented to person, place, and time. He has normal strength and normal reflexes. He displays no atrophy. No sensory deficit. He exhibits normal muscle tone. Coordination and gait normal. GCS eye subscore is 4. GCS verbal subscore is 5. GCS motor subscore is 6.   Skin: Skin is warm, dry and intact. No lesion and no rash noted. No erythema.   Psychiatric: He has a normal mood and affect. His speech is normal and behavior is normal. Judgment normal. Cognition and memory are normal.         ED Course   Procedures  Labs Reviewed   URINALYSIS - Abnormal; Notable for the following:        Result Value    Specific Gravity, UA >=1.030 (*)     Occult Blood UA 2+ (*)     All other components within normal limits   TROPONIN I - Abnormal; Notable for the following:     Troponin I 0.171 (*)     All other components within normal limits   URIC ACID - Abnormal; Notable for the following:     Uric Acid 8.1 (*)     All other components within normal limits   URINALYSIS MICROSCOPIC   CBC W/ AUTO DIFFERENTIAL   COMPREHENSIVE METABOLIC PANEL   PROTIME-INR   TROPONIN I   TROPONIN I   TROPONIN I   LIPID PANEL     EKG Readings: (Independently Interpreted)   Initial Reading: No STEMI. Rhythm: Normal Sinus Rhythm. Heart Rate: 90. Ectopy: No Ectopy. Conduction: Normal. ST Segments: Normal ST Segments. T Waves: Normal. Clinical Impression: Normal Sinus Rhythm   Repeat EKG 21:15 no significant change from previous     Imaging Results         X-Ray Chest PA And Lateral (Final result) Result time:  03/28/17 20:49:52    Final result by Bradly Martinez MD (03/28/17 20:49:52)    Impression:        No radiographic acute intrathoracic process seen.      Electronically signed by: BRADLY MARTINEZ MD, MD  Date:     03/28/17  Time:    20:49     Narrative:     COMPARISON: None    FINDINGS: Two views of the chest. EKG stickers overlie the chest.   Pulmonary vasculature and hilar regions are within normal limits.  The bilateral lungs are well expanded and clear.  No pleural effusion or pneumothorax.  The heart and mediastinal contours are within normal limits for age.  Included osseous structures show mild degenerative change without acute or destructive process seen.              X-Rays:   Independently Interpreted Readings:   Chest X-Ray: Normal heart size.  No infiltrates.  No acute abnormalities.     Medical Decision Making:   History:   Old Medical Records: I decided to obtain old medical records.  Old Records Summarized: other records.       <> Summary of Records: None. Patient is from out of town, here on business  Initial Assessment:   46-year-old male with complaints concerning for an STEMI.  Vital signs stable, afebrile, neurovascular intact.  He is alert, healthy and nontoxic appearing.  He is in no apparent distress.  Exam is documented above.  My exam is benign.  Independently Interpreted Test(s):   I have ordered and independently interpreted X-rays - see prior notes.  I have ordered and independently interpreted EKG Reading(s) - see prior notes  Clinical Tests:   Lab Tests: Ordered and Reviewed  The following lab test(s) were unremarkable: CMP, CBC and Urinalysis       <> Summary of Lab: Trop 0.171, uric acid 8.1  Radiological Study: Ordered and Reviewed  Medical Tests: Ordered and Reviewed  ED Management:  Cardiac workup obtained.  Patient has elevated troponin concerning for an STEMI.  EKG was obtained from triage and consistent with normal sinus rhythm.  Repeat EKG obtained by me with no specific change.  Consistent with sinus rhythm with sinus arrhythmia.  No evidence of ischemia on EKG.  11:14 PM discussed with Dr. Menendez, cardiologist on call.  Will admit to his service.  He recommends controlling patient's pain with sublingual nitroglycerin here  as well as Nitropatch.  He also recommends 25 mg of metoprolol twice a day and Plavix 600 mg.  Recommends obtaining cholesterol panel.  Orders placed.  Patient placed in telemetry.  Plan discussed with attending physician who agrees with treatment.  Other:   I have discussed this case with another health care provider.       <> Summary of the Discussion: Mirian, attending ED physician and Gemma Cardiologist on call.   This note was created using Dragon Medical dictation.  There may be typographical errors secondary to dictation.                     ED Course     Clinical Impression:     1. NSTEMI (non-ST elevated myocardial infarction)          Disposition:   Disposition: Admitted  Condition: Serious       Jeanette Haas PA-C  03/28/17 0339

## 2017-03-30 DIAGNOSIS — I25.10 CORONARY ARTERY DISEASE INVOLVING NATIVE CORONARY ARTERY OF NATIVE HEART, ANGINA PRESENCE UNSPECIFIED: Primary | ICD-10-CM

## 2017-03-30 PROBLEM — E78.01 FAMILIAL HYPERCHOLESTEROLEMIA: Status: ACTIVE | Noted: 2017-03-29

## 2017-03-30 LAB
ALBUMIN SERPL BCP-MCNC: 3.6 G/DL
ALP SERPL-CCNC: 66 U/L
ALT SERPL W/O P-5'-P-CCNC: 17 U/L
ANION GAP SERPL CALC-SCNC: 11 MMOL/L
AST SERPL-CCNC: 13 U/L
BASOPHILS # BLD AUTO: 0.03 K/UL
BASOPHILS # BLD AUTO: 0.03 K/UL
BASOPHILS NFR BLD: 0.3 %
BASOPHILS NFR BLD: 0.3 %
BILIRUB SERPL-MCNC: 0.6 MG/DL
BUN SERPL-MCNC: 19 MG/DL
CALCIUM SERPL-MCNC: 8.6 MG/DL
CHLORIDE SERPL-SCNC: 105 MMOL/L
CO2 SERPL-SCNC: 22 MMOL/L
CREAT SERPL-MCNC: 0.9 MG/DL
DIFFERENTIAL METHOD: ABNORMAL
DIFFERENTIAL METHOD: NORMAL
EOSINOPHIL # BLD AUTO: 0.1 K/UL
EOSINOPHIL # BLD AUTO: 0.1 K/UL
EOSINOPHIL NFR BLD: 1.3 %
EOSINOPHIL NFR BLD: 1.3 %
ERYTHROCYTE [DISTWIDTH] IN BLOOD BY AUTOMATED COUNT: 12.5 %
ERYTHROCYTE [DISTWIDTH] IN BLOOD BY AUTOMATED COUNT: 12.7 %
EST. GFR  (AFRICAN AMERICAN): >60 ML/MIN/1.73 M^2
EST. GFR  (NON AFRICAN AMERICAN): >60 ML/MIN/1.73 M^2
GLUCOSE SERPL-MCNC: 67 MG/DL
HCT VFR BLD AUTO: 43.3 %
HCT VFR BLD AUTO: 44.1 %
HGB BLD-MCNC: 14.7 G/DL
HGB BLD-MCNC: 14.8 G/DL
LYMPHOCYTES # BLD AUTO: 2.5 K/UL
LYMPHOCYTES # BLD AUTO: 2.8 K/UL
LYMPHOCYTES NFR BLD: 28.1 %
LYMPHOCYTES NFR BLD: 28.4 %
MCH RBC QN AUTO: 30.4 PG
MCH RBC QN AUTO: 30.7 PG
MCHC RBC AUTO-ENTMCNC: 33.6 %
MCHC RBC AUTO-ENTMCNC: 33.9 %
MCV RBC AUTO: 90 FL
MCV RBC AUTO: 91 FL
MONOCYTES # BLD AUTO: 1 K/UL
MONOCYTES # BLD AUTO: 1.1 K/UL
MONOCYTES NFR BLD: 11.2 %
MONOCYTES NFR BLD: 11.7 %
NEUTROPHILS # BLD AUTO: 5.3 K/UL
NEUTROPHILS # BLD AUTO: 5.6 K/UL
NEUTROPHILS NFR BLD: 58 %
NEUTROPHILS NFR BLD: 59.1 %
PLATELET # BLD AUTO: 176 K/UL
PLATELET # BLD AUTO: 178 K/UL
PMV BLD AUTO: 11.1 FL
PMV BLD AUTO: 11.1 FL
POTASSIUM SERPL-SCNC: 3.8 MMOL/L
PROT SERPL-MCNC: 6.5 G/DL
RBC # BLD AUTO: 4.79 M/UL
RBC # BLD AUTO: 4.87 M/UL
SODIUM SERPL-SCNC: 138 MMOL/L
TROPONIN I SERPL DL<=0.01 NG/ML-MCNC: 0.05 NG/ML
TROPONIN I SERPL DL<=0.01 NG/ML-MCNC: 0.11 NG/ML
TROPONIN I SERPL DL<=0.01 NG/ML-MCNC: 0.12 NG/ML
WBC # BLD AUTO: 8.99 K/UL
WBC # BLD AUTO: 9.68 K/UL

## 2017-03-30 PROCEDURE — 36415 COLL VENOUS BLD VENIPUNCTURE: CPT

## 2017-03-30 PROCEDURE — 25000003 PHARM REV CODE 250: Performed by: PHYSICIAN ASSISTANT

## 2017-03-30 PROCEDURE — 20600001 HC STEP DOWN PRIVATE ROOM

## 2017-03-30 PROCEDURE — 84484 ASSAY OF TROPONIN QUANT: CPT | Mod: 91

## 2017-03-30 PROCEDURE — 63600175 PHARM REV CODE 636 W HCPCS: Performed by: INTERNAL MEDICINE

## 2017-03-30 PROCEDURE — 80053 COMPREHEN METABOLIC PANEL: CPT

## 2017-03-30 PROCEDURE — 85025 COMPLETE CBC W/AUTO DIFF WBC: CPT | Mod: 91

## 2017-03-30 PROCEDURE — 93005 ELECTROCARDIOGRAM TRACING: CPT

## 2017-03-30 PROCEDURE — 84484 ASSAY OF TROPONIN QUANT: CPT

## 2017-03-30 PROCEDURE — 93010 ELECTROCARDIOGRAM REPORT: CPT | Mod: ,,, | Performed by: INTERNAL MEDICINE

## 2017-03-30 PROCEDURE — 25000003 PHARM REV CODE 250: Performed by: INTERNAL MEDICINE

## 2017-03-30 RX ORDER — ALLOPURINOL 100 MG/1
100 TABLET ORAL DAILY
Status: DISCONTINUED | OUTPATIENT
Start: 2017-03-30 | End: 2017-04-07 | Stop reason: HOSPADM

## 2017-03-30 RX ORDER — EZETIMIBE 10 MG/1
10 TABLET ORAL DAILY
Status: DISCONTINUED | OUTPATIENT
Start: 2017-03-30 | End: 2017-04-07 | Stop reason: HOSPADM

## 2017-03-30 RX ORDER — ENOXAPARIN SODIUM 100 MG/ML
40 INJECTION SUBCUTANEOUS EVERY 24 HOURS
Status: DISCONTINUED | OUTPATIENT
Start: 2017-03-30 | End: 2017-03-31

## 2017-03-30 RX ORDER — SODIUM CHLORIDE 0.9 % (FLUSH) 0.9 %
3 SYRINGE (ML) INJECTION EVERY 8 HOURS
Status: DISCONTINUED | OUTPATIENT
Start: 2017-03-31 | End: 2017-04-07

## 2017-03-30 RX ORDER — NITROGLYCERIN 0.4 MG/1
0.4 TABLET SUBLINGUAL EVERY 5 MIN PRN
Status: COMPLETED | OUTPATIENT
Start: 2017-03-30 | End: 2017-03-31

## 2017-03-30 RX ORDER — ONDANSETRON 8 MG/1
8 TABLET, ORALLY DISINTEGRATING ORAL EVERY 8 HOURS PRN
Status: DISCONTINUED | OUTPATIENT
Start: 2017-03-31 | End: 2017-04-03

## 2017-03-30 RX ADMIN — NITROGLYCERIN 1 PATCH: 0.4 PATCH TRANSDERMAL at 09:03

## 2017-03-30 RX ADMIN — NITROGLYCERIN 0.4 MG: 0.4 TABLET SUBLINGUAL at 10:03

## 2017-03-30 RX ADMIN — ENOXAPARIN SODIUM 40 MG: 100 INJECTION SUBCUTANEOUS at 11:03

## 2017-03-30 RX ADMIN — ALLOPURINOL 100 MG: 100 TABLET ORAL at 11:03

## 2017-03-30 RX ADMIN — METOPROLOL TARTRATE 25 MG: 25 TABLET, FILM COATED ORAL at 10:03

## 2017-03-30 RX ADMIN — EZETIMIBE 10 MG: 10 TABLET ORAL at 11:03

## 2017-03-30 RX ADMIN — ASPIRIN 81 MG: 81 TABLET, COATED ORAL at 09:03

## 2017-03-30 RX ADMIN — METOPROLOL TARTRATE 25 MG: 25 TABLET, FILM COATED ORAL at 09:03

## 2017-03-30 RX ADMIN — ROSUVASTATIN CALCIUM 40 MG: 20 TABLET, FILM COATED ORAL at 09:03

## 2017-03-30 NOTE — PROGRESS NOTES
SHARRI spoke to Abbie, house supervisor and notified of transfer to [facility], Abbie was aware, pt already on board, has accepting physican, waiting on bed.  SHARRI informed Dr. Francisco, waiting for bed.

## 2017-03-30 NOTE — PROGRESS NOTES
[facility]  Cardiology  Progress Note    Patient Name: Se Denson  MRN: [id number]  Admission Date: 3/28/2017  Hospital Length of Stay: 2 days  Code Status: Full Code   Attending Physician: Michelle Francisco MD   Primary Care Physician: Primary Doctor No  Expected Discharge Date:   Principal Problem:Coronary artery disease    Subjective:     Brief HPI:    47 yo male with hypercholesterolemia who smokes. He has a positive family history for ischemic heart disease in that his mother was having an interventional procedure at age 38. He began experience chest tightness with exertion about 3/10/2017. The pain would come on when on an elliptical machine. Since then progression with chest tightness with minimal exertion and on 3/28/2017 mild chest discomfort at rest. The pain was radiating into his arms. He received sl NTG in the ER with resolution of pain. Admitted.    Hospital Course:    3/29/2017: [facility] [place]: Cath: LAD: Prox 95%. Mid: Occlusion. Wrap around. LCX: OM1 90%. RCA: Distal 80% segmental. LV: Anteroseptal mild hypokinesia. EF 50%.    Interval History:     Maybe a very subtle discomfort in chest that he has felt for weeks.    Review of Systems   Constitution: Negative for chills, fever, weakness and malaise/fatigue.   HENT: Negative for headaches and nosebleeds.    Eyes: Negative for double vision, vision loss in left eye and vision loss in right eye.   Cardiovascular: Negative for chest pain, claudication, dyspnea on exertion, irregular heartbeat, leg swelling, near-syncope, orthopnea, palpitations, paroxysmal nocturnal dyspnea and syncope.   Respiratory: Negative for cough, hemoptysis, shortness of breath and wheezing.    Endocrine: Negative for cold intolerance and heat intolerance.   Hematologic/Lymphatic: Negative for bleeding problem. Does not bruise/bleed easily.   Skin: Negative for color change and rash.   Musculoskeletal: Negative for back pain, falls, muscle weakness and myalgias.    Gastrointestinal: Negative for heartburn, hematemesis, hematochezia, hemorrhoids, jaundice, melena, nausea and vomiting.   Genitourinary: Negative for dysuria and hematuria.   Neurological: Negative for dizziness, focal weakness, light-headedness, loss of balance, numbness and vertigo.   Psychiatric/Behavioral: Negative for altered mental status, depression and memory loss. The patient is not nervous/anxious.    Allergic/Immunologic: Negative for hives and persistent infections.     Objective:     Vital Signs (Most Recent):  Temp: 98.1 °F (36.7 °C) (03/30/17 0715)  Pulse: (!) 55 (03/30/17 0800)  Resp: 16 (03/30/17 0715)  BP: (!) 97/48 (03/30/17 0715)  SpO2: 95 % (03/30/17 0715) Vital Signs (24h Range):  Temp:  [98.1 °F (36.7 °C)-98.7 °F (37.1 °C)] 98.1 °F (36.7 °C)  Pulse:  [50-86] 55  Resp:  [16-17] 16  SpO2:  [95 %-99 %] 95 %  BP: ()/(48-71) 97/48     Weight: 104.3 kg (230 lb)  Body mass index is 29.53 kg/(m^2).    SpO2: 95 %  O2 Device (Oxygen Therapy): room air      Intake/Output Summary (Last 24 hours) at 03/30/17 1004  Last data filed at 03/30/17 0341   Gross per 24 hour   Intake              665 ml   Output                0 ml   Net              665 ml       Lines/Drains/Airways     Peripheral Intravenous Line                 Peripheral IV - Single Lumen 03/28/17 2038 Left Antecubital 1 day                Physical Exam   Constitutional: He is oriented to person, place, and time. He appears well-developed and well-nourished.  Non-toxic appearance. No distress.   HENT:   Head: Normocephalic and atraumatic.   Nose: Nose normal.   Eyes: Right eye exhibits no discharge. Left eye exhibits no discharge. Right conjunctiva is not injected. Left conjunctiva is not injected. Right pupil is round. Left pupil is round. Pupils are equal.   Neck: Neck supple. No JVD present. Carotid bruit is not present. No thyromegaly present.   Cardiovascular: Normal rate, regular rhythm, S1 normal and S2 normal.   No  extrasystoles are present. PMI is not displaced.  Exam reveals no gallop.    Pulses:       Radial pulses are 2+ on the right side, and 2+ on the left side.        Femoral pulses are 2+ on the right side, and 2+ on the left side.       Dorsalis pedis pulses are 2+ on the right side, and 2+ on the left side.        Posterior tibial pulses are 2+ on the right side, and 2+ on the left side.   Pulmonary/Chest: Effort normal and breath sounds normal.   Abdominal: Soft. Normal appearance. There is no hepatosplenomegaly. There is no tenderness.   Musculoskeletal:        Right hip: He exhibits no swelling.        Right ankle: He exhibits no swelling, no ecchymosis and no deformity.        Left ankle: He exhibits no swelling, no ecchymosis and no deformity.   Lymphadenopathy:        Head (right side): No submandibular adenopathy present.        Head (left side): No submandibular adenopathy present.     He has no cervical adenopathy.   Neurological: He is alert and oriented to person, place, and time. He is not disoriented. No cranial nerve deficit.   Skin: Skin is warm, dry and intact. No rash noted. He is not diaphoretic. No cyanosis. Nails show no clubbing.   Psychiatric: He has a normal mood and affect. His speech is normal and behavior is normal. Judgment and thought content normal. Cognition and memory are normal.     Current Medications:     aspirin  81 mg Oral Daily    metoprolol tartrate  25 mg Oral BID    nitroGLYCERIN 0.4 MG/HR TD PT24  1 patch Transdermal Daily    rosuvastatin  40 mg Oral Daily    sodium chloride 0.9%  3 mL Intravenous Q8H     Current Laboratory Results:    Recent Results (from the past 24 hour(s))   2D echo with color flow doppler    Collection Time: 03/29/17 11:30 AM   Result Value Ref Range    EF 50 55 - 65    Diastolic Dysfunction No     Pericardial Effusion NONE    Troponin I    Collection Time: 03/29/17 12:24 PM   Result Value Ref Range    Troponin I 0.127 (H) 0.000 - 0.026 ng/mL   Cath  lab procedure    Collection Time: 03/29/17  3:00 PM   Result Value Ref Range    Coronary Stenosis >= 50% (A)    Troponin I    Collection Time: 03/30/17 12:22 AM   Result Value Ref Range    Troponin I 0.112 (H) 0.000 - 0.026 ng/mL   CBC auto differential    Collection Time: 03/30/17 12:22 AM   Result Value Ref Range    WBC 8.99 3.90 - 12.70 K/uL    RBC 4.87 4.60 - 6.20 M/uL    Hemoglobin 14.8 14.0 - 18.0 g/dL    Hematocrit 44.1 40.0 - 54.0 %    MCV 91 82 - 98 fL    MCH 30.4 27.0 - 31.0 pg    MCHC 33.6 32.0 - 36.0 %    RDW 12.7 11.5 - 14.5 %    Platelets 176 150 - 350 K/uL    MPV 11.1 9.2 - 12.9 fL    Gran # 5.3 1.8 - 7.7 K/uL    Lymph # 2.5 1.0 - 4.8 K/uL    Mono # 1.0 0.3 - 1.0 K/uL    Eos # 0.1 0.0 - 0.5 K/uL    Baso # 0.03 0.00 - 0.20 K/uL    Gran% 59.1 38.0 - 73.0 %    Lymph% 28.1 18.0 - 48.0 %    Mono% 11.2 4.0 - 15.0 %    Eosinophil% 1.3 0.0 - 8.0 %    Basophil% 0.3 0.0 - 1.9 %    Differential Method Automated    Comprehensive metabolic panel    Collection Time: 03/30/17  6:00 AM   Result Value Ref Range    Sodium 138 136 - 145 mmol/L    Potassium 3.8 3.5 - 5.1 mmol/L    Chloride 105 95 - 110 mmol/L    CO2 22 (L) 23 - 29 mmol/L    Glucose 67 (L) 70 - 110 mg/dL    BUN, Bld 19 6 - 20 mg/dL    Creatinine 0.9 0.5 - 1.4 mg/dL    Calcium 8.6 (L) 8.7 - 10.5 mg/dL    Total Protein 6.5 6.0 - 8.4 g/dL    Albumin 3.6 3.5 - 5.2 g/dL    Total Bilirubin 0.6 0.1 - 1.0 mg/dL    Alkaline Phosphatase 66 55 - 135 U/L    AST 13 10 - 40 U/L    ALT 17 10 - 44 U/L    Anion Gap 11 8 - 16 mmol/L    eGFR if African American >60 >60 mL/min/1.73 m^2    eGFR if non African American >60 >60 mL/min/1.73 m^2   Troponin I    Collection Time: 03/30/17  6:00 AM   Result Value Ref Range    Troponin I 0.120 (H) 0.000 - 0.026 ng/mL     Current Imaging Results:    Imaging Results         X-Ray Chest PA And Lateral (Final result) Result time:  03/28/17 20:49:52    Final result by Carlo Martinez MD (03/28/17 20:49:52)    Impression:        No  radiographic acute intrathoracic process seen.      Electronically signed by: BRADLY FLORIAN MD, MD  Date:     03/28/17  Time:    20:49     Narrative:    COMPARISON: None    FINDINGS: Two views of the chest. EKG stickers overlie the chest.   Pulmonary vasculature and hilar regions are within normal limits.  The bilateral lungs are well expanded and clear.  No pleural effusion or pneumothorax.  The heart and mediastinal contours are within normal limits for age.  Included osseous structures show mild degenerative change without acute or destructive process seen.            3/29/2017: Echo:    TEST DESCRIPTION   Technical Quality: This is a technically adequate study.     Aorta: The aortic root is normal in size, measuring 2.5 cm at sinotubular junction and 3.1 cm at Sinuses of Valsalva. The proximal ascending aorta is normal in size, measuring 2.9 cm across.     Left Atrium: The left atrial volume index is normal, measuring 28.96 cc/m2.     Left Ventricle: The left ventricle is normal in size, with an end-diastolic diameter of 4.1 cm, and an end-systolic diameter of 2.7 cm. Wall thickness is mildly increased, with the septum measuring 1.3 cm and the posterior wall measuring 1.2 cm across.   Relative wall thickness was increased at 0.59, and the LV mass index was 92.5 g/m2 consistent with concentric remodeling. The apex is mildly hypokinetic.   The following segments were mildly hypokinetic: apical anterior wall, mid anterior wall.  Global left ventricular systolic function appears low normal to mildly depressed. Visually estimated ejection fraction is 50-55%. The LV Doppler derived stroke volume equals 73.0 ccs.   The E/e'(lat) is 7, consistent with normal diastolic function.     Right Atrium: The right atrium is normal in size, measuring 4.1 cm in length and 3.5 cm in width in the apical view.     Right Ventricle: The right ventricle is normal in size measuring 3.5 cm at the base in the apical right ventricle-focused  view. Global right ventricular systolic function appears normal. Tricuspid annular plane systolic excursion (TAPSE) is 1.6 cm.   Tissue Doppler-derived tricuspid annular peak systolic velocity (S prime) is .1 cm/s.     Aortic Valve:  Aortic valve is normal in structure with normal leaflet mobility.     Mitral Valve:  Mitral valve is normal in structure with normal leaflet mobility.     Tricuspid Valve:  Tricuspid valve is normal in structure with normal leaflet mobility.     Pulmonary Valve:  Pulmonary valve is normal in structure with normal leaflet mobility.     Pericardium: There is no evidence of pericardial effusion.      IVC: IVC is normal in size and collapses > 50% with a sniff, suggesting normal right atrial pressure of 3 mmHg.     Intracavitary: There is no evidence of intracavitary mass or thrombus. There is no evidence of vegetation.     CONCLUSIONS     1 - Concentric remodeling.     2 - Low normal to mildly depressed left ventricular systolic function (EF 50-55%).     3 - Anteroseptal/apical WMA.     This document has been electronically    SIGNED BY: Michelle Francisco MD On: 03/29/2017 18:53    3/29/2017: Cath:    D. Hemodynamic Results     LVEDP (Pre): 8 mmHg  LVEDP (Post): 10 mmHg  Ejection Fraction: 50%  Global LV Function: normal  Wall Motion: mildly hypokinetic in the anteroapical wall    E. Angiographic Results     Diagnostic:          Patient has a right dominant coronary artery.        - Left Main Coronary Artery:             The LM is normal. There is FAYE 3 flow.     - Left Anterior Descending Artery:             The proximal LAD has a 90% stenosis. There is FAYE 2 flow.             The mid LAD has chronic total occlusion. There is FAYE 0 flow. LAD wrap around. Fills late from RCA.     - Left Circumflex Artery:             The LCX has luminal irregularities. There is FAYE 3 flow.     - OM1:             The proximal OM1 has a 90% stenosis. There is FAYE 3 flow. Large branching vessel.     -  Right Coronary Artery:             The distal RCA has a 80% stenosis. There is FAYE 3 flow. The remaining portion of the vessel has luminal irregularities.                     Lesion Details:   The length is 40mm.      Assessment and Plan:     Problem List:    Active Diagnoses:    Diagnosis Date Noted POA    PRINCIPAL PROBLEM:  Coronary artery disease [I25.10] 03/29/2017 Yes    Non-ST elevation myocardial infarction (NSTEMI) [I21.4] 03/28/2017 Yes    Familial hypercholesterolemia [E78.01] 03/29/2017 Yes    Tobacco use [Z72.0] 03/29/2017 Yes    Family history of ischemic heart disease [Z82.49] 03/29/2017 Not Applicable    Nephrolithiasis [N20.0] 03/29/2017 Yes    Hyperuricemia [E79.0] 03/29/2017 Yes      Problems Resolved During this Admission:    Diagnosis Date Noted Date Resolved POA     Assessment and Plan:    1. Coronary Artery Disease              3/28/2017: NSTEMI. Troponin 0.2.    3/29/2017: [facility] [place]: Cath: LAD: Prox 95%. Mid: Occlusion. Wrap around. LCX: OM1 90%. RCA: Distal 80% segmental. LV: Anteroseptal mild hypokinesia. EF 50%.   3/29/2017: Echo: Normal left ventricular size with low normal systolic function. EF 50-55%. Anteroseptal/apical WMA. Mild LVH.   Metoprolol and NTG patch.              On aspirin 81 mg Q24.   3/28/2017: Recieved clopidogrel loading dose of 600 mg.   Think he is best off with CABG.              Should stay in hospital until CABG.   Begin enoxaparin 40 mg Q24.   Transfer to [facility] [place] today.     2. Familial Hypercholesterolemia              3/29/2017: Chol 381. HDL 42. . .   Consistent with familial hypercholesterolemia.   On rosuvastatin 40 mg Q24 but off last four weeks so above panel done off treatment.   Add ezetimide 10 mg Q24.   Should most certainly be on PCSK9 inhibitor as well long term.   Discussed having his children tested.     3. Tobacco Use              1990: Began smoking.              Unable to quit.              Strongly advised to quit.     4. Family  History for Ischemic Heart Disease              Mother received coronary stent at age 38.     5. Nephrolithiasis              2016: Diagnosed.              Followed in [place].     6. Hyperuricemia              2016: Diagnosed.              Followed in [place].   3/29/2017: Uric acid 8.1.   Begin allopurinol 100 mg Q24.    VTE Risk Mitigation         Ordered     Medium Risk of VTE  Once      03/28/17 2310     Place sequential compression device  Until discontinued      03/28/17 2310          Michelle Francisco MD  Cardiology  [facility]

## 2017-03-30 NOTE — PROGRESS NOTES
Received call from transfer center.  Pt going to room 303A, cardiology step down unit.  Call report to [phone], ext [phone].  Transfer center will set up transport via [facility].  Dr. Francisco notified.  Pt notified of impending transfer.

## 2017-03-30 NOTE — PLAN OF CARE
Problem: Patient Care Overview  Goal: Plan of Care Review  Outcome: Ongoing (interventions implemented as appropriate)  Patient remains free from injury or falls. Vital signs stable throughout night on room air. Positions self independently and up to bathroom after 6 hours post procedure. Neuro checks intact. No bleeding or c/o pain to site. Pain managed with PO and SL medications for flank pain and chest tightness. Telemetry maintained. Colleague visited and brought pt belongings to bedside. Pt updated on transfer plan. Bed in low locked position and call light within reach. Will continue to monitor.

## 2017-03-31 ENCOUNTER — APPOINTMENT (OUTPATIENT)
Dept: CARDIOLOGY | Facility: CLINIC | Age: 47
DRG: 234 | End: 2017-03-31
Attending: EMERGENCY MEDICINE
Payer: COMMERCIAL

## 2017-03-31 ENCOUNTER — ANESTHESIA EVENT (OUTPATIENT)
Dept: SURGERY | Facility: HOSPITAL | Age: 47
DRG: 234 | End: 2017-03-31
Payer: COMMERCIAL

## 2017-03-31 LAB
ANION GAP SERPL CALC-SCNC: 6 MMOL/L
BASOPHILS # BLD AUTO: 0.04 K/UL
BASOPHILS NFR BLD: 0.6 %
BUN SERPL-MCNC: 16 MG/DL
CALCIUM SERPL-MCNC: 8.7 MG/DL
CHLORIDE SERPL-SCNC: 109 MMOL/L
CO2 SERPL-SCNC: 24 MMOL/L
CREAT SERPL-MCNC: 0.9 MG/DL
DIFFERENTIAL METHOD: NORMAL
EOSINOPHIL # BLD AUTO: 0.2 K/UL
EOSINOPHIL NFR BLD: 2.2 %
ERYTHROCYTE [DISTWIDTH] IN BLOOD BY AUTOMATED COUNT: 12.2 %
EST. GFR  (AFRICAN AMERICAN): >60 ML/MIN/1.73 M^2
EST. GFR  (NON AFRICAN AMERICAN): >60 ML/MIN/1.73 M^2
GLUCOSE SERPL-MCNC: 96 MG/DL
HCT VFR BLD AUTO: 41 %
HGB BLD-MCNC: 14.1 G/DL
INTERNAL CAROTID STENOSIS: NORMAL
LYMPHOCYTES # BLD AUTO: 2.4 K/UL
LYMPHOCYTES NFR BLD: 35.7 %
MAGNESIUM SERPL-MCNC: 2.2 MG/DL
MCH RBC QN AUTO: 29.9 PG
MCHC RBC AUTO-ENTMCNC: 34.4 %
MCV RBC AUTO: 87 FL
MONOCYTES # BLD AUTO: 0.8 K/UL
MONOCYTES NFR BLD: 11.7 %
NEUTROPHILS # BLD AUTO: 3.4 K/UL
NEUTROPHILS NFR BLD: 49.2 %
PHOSPHATE SERPL-MCNC: 3.8 MG/DL
PLATELET # BLD AUTO: 155 K/UL
PMV BLD AUTO: 10.1 FL
POTASSIUM SERPL-SCNC: 4.1 MMOL/L
RBC # BLD AUTO: 4.72 M/UL
SODIUM SERPL-SCNC: 139 MMOL/L
WBC # BLD AUTO: 6.84 K/UL

## 2017-03-31 PROCEDURE — 25000003 PHARM REV CODE 250: Performed by: PHYSICIAN ASSISTANT

## 2017-03-31 PROCEDURE — 99406 BEHAV CHNG SMOKING 3-10 MIN: CPT

## 2017-03-31 PROCEDURE — 25000003 PHARM REV CODE 250: Performed by: INTERNAL MEDICINE

## 2017-03-31 PROCEDURE — 99900035 HC TECH TIME PER 15 MIN (STAT)

## 2017-03-31 PROCEDURE — 36415 COLL VENOUS BLD VENIPUNCTURE: CPT

## 2017-03-31 PROCEDURE — 93880 EXTRACRANIAL BILAT STUDY: CPT

## 2017-03-31 PROCEDURE — 25000003 PHARM REV CODE 250: Performed by: SURGERY

## 2017-03-31 PROCEDURE — 20600001 HC STEP DOWN PRIVATE ROOM

## 2017-03-31 PROCEDURE — 85025 COMPLETE CBC W/AUTO DIFF WBC: CPT

## 2017-03-31 PROCEDURE — 93880 EXTRACRANIAL BILAT STUDY: CPT | Mod: 26,,, | Performed by: INTERNAL MEDICINE

## 2017-03-31 PROCEDURE — 83735 ASSAY OF MAGNESIUM: CPT

## 2017-03-31 PROCEDURE — 80048 BASIC METABOLIC PNL TOTAL CA: CPT

## 2017-03-31 PROCEDURE — 84100 ASSAY OF PHOSPHORUS: CPT

## 2017-03-31 RX ORDER — NITROGLYCERIN 0.4 MG/1
0.4 TABLET SUBLINGUAL EVERY 5 MIN PRN
Status: DISCONTINUED | OUTPATIENT
Start: 2017-03-31 | End: 2017-04-03

## 2017-03-31 RX ADMIN — Medication 3 ML: at 09:03

## 2017-03-31 RX ADMIN — NITROGLYCERIN 1 PATCH: 0.4 PATCH TRANSDERMAL at 08:03

## 2017-03-31 RX ADMIN — EZETIMIBE 10 MG: 10 TABLET ORAL at 08:03

## 2017-03-31 RX ADMIN — ASPIRIN 81 MG: 81 TABLET, COATED ORAL at 08:03

## 2017-03-31 RX ADMIN — NITROGLYCERIN 0.4 MG: 0.4 TABLET SUBLINGUAL at 07:03

## 2017-03-31 RX ADMIN — METOPROLOL TARTRATE 25 MG: 25 TABLET, FILM COATED ORAL at 09:03

## 2017-03-31 RX ADMIN — ALLOPURINOL 100 MG: 100 TABLET ORAL at 08:03

## 2017-03-31 RX ADMIN — ZOLPIDEM TARTRATE 5 MG: 5 TABLET, FILM COATED ORAL at 09:03

## 2017-03-31 RX ADMIN — Medication 3 ML: at 06:03

## 2017-03-31 RX ADMIN — ROSUVASTATIN CALCIUM 40 MG: 20 TABLET, FILM COATED ORAL at 08:03

## 2017-03-31 RX ADMIN — NITROGLYCERIN 0.4 MG: 0.4 TABLET SUBLINGUAL at 02:03

## 2017-03-31 RX ADMIN — METOPROLOL TARTRATE 25 MG: 25 TABLET, FILM COATED ORAL at 08:03

## 2017-03-31 NOTE — PLAN OF CARE
Problem: Patient Care Overview  Goal: Plan of Care Review  Outcome: Ongoing (interventions implemented as appropriate)  No significant events occurred during the night. Pt remained free from falls/trauma/injury. VSS. Denies any CP, SOB, palpitations, dizziness, pain and discomfort. Turning/repositioning independently in bed. Pt here awaiting a CABG planned for Monday. Plan of care reviewed with patient and all questions answered, verbalizes understanding. No acute distress noted. Will continue to monitor.

## 2017-03-31 NOTE — PLAN OF CARE
CM attempt to see pt. In shower at this time. Dc planning assessment done per [facility] CM. Plan for surgery on Monday.

## 2017-03-31 NOTE — PLAN OF CARE
Problem: Patient Care Overview  Goal: Plan of Care Review  Outcome: Ongoing (interventions implemented as appropriate)  Plan of care discussed with patient. Patient is free of fall/trauma/injury. Denies SOB. Chest pain reported, relieved with two nitro. Vitals stable. All questions addressed. Will continue to monitor.

## 2017-03-31 NOTE — PROGRESS NOTES
Patient Consulted by CTTS:     The following was discussed by the Tobacco Treatment Specialist:  ? Relevance of Quitting  ? Risk to Health  ? Long Term Risk  ? Risk for Others  ? Rewards of Quitting  ? Motivation Intervention to Quit

## 2017-03-31 NOTE — PROGRESS NOTES
Patient complaining of chest pain 3/10, says it feels like a pressure in his chest and a slight headache. Ama, NP notified. 0.4mg of nitro sublingual given. /70 map 83. No ECG changes.

## 2017-03-31 NOTE — PROGRESS NOTES
Second 0.4mg tablet of nitro given, chest pain fully relieved. /65 map 78. Will continue to monitor.

## 2017-03-31 NOTE — ANESTHESIA PREPROCEDURE EVALUATION
03/31/2017  Se Denson is a 46 y.o., male.    Pre-operative evaluation for Procedure(s) (LRB):  AORTOCORONARY BYPASS-CABG/CABGX3 (N/A)  HARVEST-VEIN-ENDOVASCULAR (N/A)    Se Denson is a 46 y.o. male w/ h/o tobacco abuse (1/2ppd x 20y), angina and NSTEMI w/ multivessel CAD being evaluated for the above procedure.     VSS on RA.     LDA:  18g L AC     Prev airway: none in system    Drips:   None currently    Patient Active Problem List   Diagnosis    Non-ST elevation myocardial infarction (NSTEMI)    Familial hypercholesterolemia    Tobacco use    Family history of ischemic heart disease    Nephrolithiasis    Hyperuricemia    Coronary artery disease       Review of patient's allergies indicates:   Allergen Reactions    Morpholine analogues Other (See Comments)        No current facility-administered medications on file prior to encounter.      No current outpatient prescriptions on file prior to encounter.       Past Surgical History:   Procedure Laterality Date    LITHOTRIPSY         Social History     Social History    Marital status:      Spouse name: N/A    Number of children: N/A    Years of education: N/A     Occupational History    Not on file.     Social History Main Topics    Smoking status: Current Every Day Smoker     Packs/day: 0.25     Start date: 1/1/1990    Smokeless tobacco: Never Used    Alcohol use Yes    Drug use: No    Sexual activity: Not on file     Other Topics Concern    Not on file     Social History Narrative         Vital Signs Range (Last 24H):  Temp:  [36.5 °C (97.7 °F)-36.8 °C (98.3 °F)]   Pulse:  [58-95]   Resp:  [16-18]   BP: ()/(53-78)   SpO2:  [95 %-98 %]       CBC:   Recent Labs      03/30/17   0600  03/31/17   0610   WBC  9.68  6.84   RBC  4.79  4.72   HGB  14.7  14.1   HCT  43.3  41.0   PLT  178  155   MCV  90  87   MCH   30.7  29.9   MCHC  33.9  34.4       CMP:   Recent Labs      03/28/17   2043  03/30/17   0600  03/31/17   0610   NA  141  138  139   K  4.4  3.8  4.1   CL  106  105  109   CO2  25  22*  24   BUN  19  19  16   CREATININE  1.0  0.9  0.9   GLU  89  67*  96   MG   --    --   2.2   PHOS   --    --   3.8   CALCIUM  9.2  8.6*  8.7   ALBUMIN  4.2  3.6   --    PROT  7.2  6.5   --    ALKPHOS  79  66   --    ALT  21  17   --    AST  16  13   --    BILITOT  0.5  0.6   --        INR  Recent Labs      03/28/17 2043   INR  0.9           Diagnostic Studies:  Cath lab:  Diagnostic:          Patient has a right dominant coronary artery.        - Left Main Coronary Artery:             The LM is normal. There is FAYE 3 flow.     - Left Anterior Descending Artery:             The proximal LAD has a 90% stenosis. There is FAYE 2 flow.             The mid LAD has chronic total occlusion. There is FAYE 0 flow. LAD wrap around. Fills late from RCA.     - Left Circumflex Artery:             The LCX has luminal irregularities. There is FAYE 3 flow.     - OM1:             The proximal OM1 has a 90% stenosis. There is FAYE 3 flow. Large branching vessel.     - Right Coronary Artery:             The distal RCA has a 80% stenosis. There is FAYE 3 flow. The remaining portion of the vessel has luminal irregularities.                     Lesion Details:   The length is 40mm.    EKG:  Vent. Rate : 090 BPM     Atrial Rate : 090 BPM     P-R Int : 166 ms          QRS Dur : 092 ms      QT Int : 366 ms       P-R-T Axes : 052 085 055 degrees     QTc Int : 447 ms    Normal sinus rhythm  Nonspecific ST abnormality  Abnormal ECG    Confirmed by Michelle Francisco MD ([id number]) on 3/29/2017 12:43:40 PM    2D Echo:  CONCLUSIONS     1 - Concentric remodeling.     2 - Low normal to mildly depressed left ventricular systolic function (EF 50-55%).     3 - Anteroseptal/apical WMA.     This document has been electronically    SIGNED BY: Michelle Francisco MD On: 03/29/2017  18:53        [facility] Anesthesia Evaluation         Review of Systems  Anesthesia Hx:  History of prior surgery of interest to airway management or planning: Denies Family Hx of Anesthesia complications.  Denies Personal Hx of Anesthesia complications.   Social:  Smoker, Social Alcohol Use    EENT/Dental:EENT/Dental Normal   Cardiovascular:   Past MI CAD   Angina    Pulmonary:   Shortness of breath    Renal/:   Chronic Renal Disease renal calculi    Neurological:  Neurology Normal    Endocrine:  Endocrine Normal        Physical Exam  General:  Well nourished    Airway/Jaw/Neck:  Airway Findings: Mouth Opening: Normal General Airway Assessment: Adult  Mallampati: II  Improves to I with phonation.  TM Distance: Normal, at least 6 cm  Jaw/Neck Findings:  Neck ROM: Normal ROM     Eyes/Ears/Nose:  Eyes/Ears/Nose Findings:    Dental:  Dental Findings: In tact   Chest/Lungs:  Chest/Lungs Findings: Normal Respiratory Rate     Heart/Vascular:  Heart Findings: Rate: Normal  Rhythm: Regular Rhythm        Mental Status:  Mental Status Findings:  Cooperative, Alert and Oriented         Anesthesia Plan  Type of Anesthesia, risks & benefits discussed:  Anesthesia Type:  general  Patient's Preference:   Intra-op Monitoring Plan: arterial line, central line and standard ASA monitors  Intra-op Monitoring Plan Comments:   Post Op Pain Control Plan:   Post Op Pain Control Plan Comments:   Induction:   IV  Beta Blocker:  Patient is on a Beta-Blocker and has received one dose within the past 24 hours (No further documentation required).       Informed Consent: Patient understands risks and agrees with Anesthesia plan.  Questions answered. Anesthesia consent signed with patient.  ASA Score: 4     Day of Surgery Review of History & Physical:    H&P update referred to the surgeon.     Anesthesia Plan Notes: A-line, central line, KASSIDY, intubation in post-op/ICU period.  No swallowing difficulties        Ready For Surgery From Anesthesia  Perspective.

## 2017-03-31 NOTE — H&P
[facility]  Cardiothoracic Surgery  History and Physical    Subjective:     Patient is a 46 y.o. male who was admitted to [facility] with NSTEMI.  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  He presented to [facility] and workup revealed NSTEMI.  He reports that over the past several weeks, he has noticed an increase in episodes of chest tightness with exercise.  He first noticed it with more intense exercise on an elliptical; however, this has progressed to having the same chest tightness with walking up a flight of stairs or carrying groceries.  He has lost 70 lbs over the past 2 years through diet and exercise; however, his angina has kept him from exercising for the past 3 weeks.  His chest pain on admission was relieved by NTG and EKG showed no significant ST segment changes.  Troponin was elevated to a peak of 0.148.  He denies shortness of breath, PND, diaphoresis, fever, chills.  He admits to history of kidney stones with associated back pain.  He is a current 1/2 ppd smoker and has smoked as such for roughly 20 years.    Cardiac catheterization demonstrated multivessel disease.   Cardiac risk factors include dyslipidemia, family history of premature cardiovascular disease, male gender and smoking/ tobacco exposure.    Patient Active Problem List    Diagnosis Date Noted    Familial hypercholesterolemia 03/29/2017    Tobacco use 03/29/2017    Family history of ischemic heart disease 03/29/2017    Nephrolithiasis 03/29/2017    Hyperuricemia 03/29/2017    Coronary artery disease 03/29/2017    Non-ST elevation myocardial infarction (NSTEMI) 03/28/2017     Past Medical History:   Diagnosis Date    Family history of ischemic heart disease 3/29/2017    Mother received coronary stent at age 38.    Gout     Hyperuricemia 3/29/2017    2016: Diagnosed.    Kidney stones     Nephrolithiasis 3/29/2017    2016: Diagnosed.     "Tobacco use 3/29/2017    1990: Began smoking. Unable to quit.      Past Surgical History:   Procedure Laterality Date    LITHOTRIPSY        Prescriptions Prior to Admission   Medication Sig Dispense Refill Last Dose    rosuvastatin (CRESTOR) 40 MG Tab Take 40 mg by mouth once daily.   3/29/2017 at Unknown time     Review of patient's allergies indicates:   Allergen Reactions    Morpholine analogues Other (See Comments)      Social History   Substance Use Topics    Smoking status: Current Every Day Smoker     Packs/day: 0.25     Start date: 1/1/1990    Smokeless tobacco: Never Used    Alcohol use Yes      Family history:  CAD/MI: maternal uncle MI at 35; maternal gpa MI at 48; paternal gpa MI at 47.    Review of Systems  Constitution: Negative for chills, fever, weakness and malaise/fatigue.   HENT: Negative for headaches and nosebleeds.   Eyes: Negative for double vision, vision loss in left eye and vision loss in right eye.   Cardiovascular: Positive for chest pain. Negative for claudication, dyspnea on exertion, irregular heartbeat, leg swelling, near-syncope, orthopnea, palpitations, paroxysmal nocturnal dyspnea and syncope.   Respiratory: Negative for cough, hemoptysis, shortness of breath and wheezing.   Hematologic/Lymphatic: Does not bruise/bleed easily.   Skin: Negative for color change and rash.   Musculoskeletal: Positive for back pain  Gastrointestinal: Negative  Genitourinary: Negative for dysuria and hematuria.   Neurological: Negative for dizziness, light-headedness    Objective:     Patient Vitals for the past 8 hrs:   BP Temp Temp src Pulse Resp SpO2   03/30/17 2300 - - - 72 - -   03/30/17 2100 117/67 97.8 °F (36.6 °C) Oral 83 18 96 %     /67 (BP Location: Right arm, Patient Position: Lying, BP Method: Automatic)  Pulse 72  Temp 97.8 °F (36.6 °C) (Oral)   Resp 18  Ht 6' 2" (1.88 m)  Wt 104.3 kg (230 lb)  SpO2 96%  BMI 29.53 kg/m2    General Appearance:    Alert, cooperative, no " distress, appears stated age   Head:    Normocephalic, atraumatic   Eyes:    PERRL, conjunctiva/corneas clear, EOM's intact   Neck:   Supple, symmetrical, trachea midline, no carotid    bruit or JVD   Lungs:     Clear to auscultation bilaterally, respirations unlabored   Chest wall:    No tenderness or deformity   Heart:    Regular rate and rhythm, S1 and S2 normal, no murmur, rub   or gallop   Abdomen:     Soft, non-tender, bowel sounds active all four quadrants,     no masses, no organomegaly   Extremities:   Extremities normal, atraumatic, no cyanosis or edema   Pulses:   2+ and symmetric all extremities   Neurologic:   CNII-XII grossly intact. Normal strength       Data Review:   CBC:   Lab Results   Component Value Date    WBC 9.68 03/30/2017    RBC 4.79 03/30/2017    HGB 14.7 03/30/2017    HCT 43.3 03/30/2017     03/30/2017     BMP:   Lab Results   Component Value Date    GLU 67 (L) 03/30/2017     03/30/2017    K 3.8 03/30/2017     03/30/2017    CO2 22 (L) 03/30/2017    BUN 19 03/30/2017    CREATININE 0.9 03/30/2017    CALCIUM 8.6 (L) 03/30/2017     Coagulation:   Lab Results   Component Value Date    INR 0.9 03/28/2017     Troponin: 0.053    ECG:  Normal sinus rhythm  Nonspecific ST abnormality    Chest X-Ray: normal     Echo:  CONCLUSIONS     1 - Concentric remodeling.     2 - Low normal to mildly depressed left ventricular systolic function (EF 50-55%).     3 - Anteroseptal/apical WMA.   Aortic Valve:  Aortic valve is normal in structure with normal leaflet mobility.   Mitral Valve:  Mitral valve is normal in structure with normal leaflet mobility.   Tricuspid Valve:  Tricuspid valve is normal in structure with normal leaflet mobility.   Pulmonary Valve:  Pulmonary valve is normal in structure with normal leaflet mobility.     LHC:       Patient has a right dominant coronary artery.      - Left Main Coronary Artery:             The LM is normal. There is FAYE 3 flow.     - Left Anterior  Descending Artery:             The proximal LAD has a 90% stenosis. There is FAYE 2 flow.             The mid LAD has chronic total occlusion. There is FAYE 0 flow. LAD wrap around. Fills late from RCA.     - Left Circumflex Artery:             The LCX has luminal irregularities. There is FAYE 3 flow.     - OM1:             The proximal OM1 has a 90% stenosis. There is FAYE 3 flow. Large branching vessel.     - Right Coronary Artery:             The distal RCA has a 80% stenosis. There is FAYE 3 flow. The remaining portion of the vessel has luminal irregularities.                     Lesion Details:   The length is 40mm.      Assessment:     Se Denson is a 46 y.o. male with multivessel CAD s/p recent NSTEMI.    Plan:     -Admit to CTS, Dr. Allen  -Troponin rechecked and downtrending, will stop trending at this point  -Patient would benefit from CABG, tentatively scheduled for Monday  -Patient will remain in hospital until CABG given worsening symptoms over relatively short period of time and pattern of occlusions (total occlusion of mid LAD with reconstitution from RCA, which has 80% stenosis in distal RCA)  -STS risk 0.45% mortality  -NTG PRN  -OK for regular diet  -ASA, Beta blocker, Statin  -Discussed importance of smoking cessation  -Lovenox for DVT ppx    C. Franklin Parisi MD  PGY-3  Pager: [phone]

## 2017-04-01 PROBLEM — Z72.0 TOBACCO USE: Status: RESOLVED | Noted: 2017-03-29 | Resolved: 2017-04-01

## 2017-04-01 LAB
ANION GAP SERPL CALC-SCNC: 10 MMOL/L
BASOPHILS # BLD AUTO: 0.04 K/UL
BASOPHILS NFR BLD: 0.5 %
BUN SERPL-MCNC: 16 MG/DL
CALCIUM SERPL-MCNC: 8.9 MG/DL
CHLORIDE SERPL-SCNC: 106 MMOL/L
CO2 SERPL-SCNC: 23 MMOL/L
CREAT SERPL-MCNC: 1 MG/DL
DIFFERENTIAL METHOD: NORMAL
EOSINOPHIL # BLD AUTO: 0.2 K/UL
EOSINOPHIL NFR BLD: 2.3 %
ERYTHROCYTE [DISTWIDTH] IN BLOOD BY AUTOMATED COUNT: 12.1 %
EST. GFR  (AFRICAN AMERICAN): >60 ML/MIN/1.73 M^2
EST. GFR  (NON AFRICAN AMERICAN): >60 ML/MIN/1.73 M^2
GLUCOSE SERPL-MCNC: 83 MG/DL
HCT VFR BLD AUTO: 40.2 %
HGB BLD-MCNC: 14.1 G/DL
LYMPHOCYTES # BLD AUTO: 2.7 K/UL
LYMPHOCYTES NFR BLD: 34.6 %
MAGNESIUM SERPL-MCNC: 2.2 MG/DL
MCH RBC QN AUTO: 30.5 PG
MCHC RBC AUTO-ENTMCNC: 35.1 %
MCV RBC AUTO: 87 FL
MONOCYTES # BLD AUTO: 0.9 K/UL
MONOCYTES NFR BLD: 11.5 %
NEUTROPHILS # BLD AUTO: 4 K/UL
NEUTROPHILS NFR BLD: 50.7 %
PHOSPHATE SERPL-MCNC: 4.2 MG/DL
PLATELET # BLD AUTO: 165 K/UL
PMV BLD AUTO: 10.7 FL
POTASSIUM SERPL-SCNC: 4.3 MMOL/L
RBC # BLD AUTO: 4.62 M/UL
SODIUM SERPL-SCNC: 139 MMOL/L
WBC # BLD AUTO: 7.93 K/UL

## 2017-04-01 PROCEDURE — 25000003 PHARM REV CODE 250: Performed by: SURGERY

## 2017-04-01 PROCEDURE — 85025 COMPLETE CBC W/AUTO DIFF WBC: CPT

## 2017-04-01 PROCEDURE — 25000003 PHARM REV CODE 250: Performed by: INTERNAL MEDICINE

## 2017-04-01 PROCEDURE — 84100 ASSAY OF PHOSPHORUS: CPT

## 2017-04-01 PROCEDURE — 25000003 PHARM REV CODE 250: Performed by: PHYSICIAN ASSISTANT

## 2017-04-01 PROCEDURE — 83735 ASSAY OF MAGNESIUM: CPT

## 2017-04-01 PROCEDURE — 36415 COLL VENOUS BLD VENIPUNCTURE: CPT

## 2017-04-01 PROCEDURE — 20600001 HC STEP DOWN PRIVATE ROOM

## 2017-04-01 PROCEDURE — 80048 BASIC METABOLIC PNL TOTAL CA: CPT

## 2017-04-01 RX ADMIN — ASPIRIN 81 MG: 81 TABLET, COATED ORAL at 09:04

## 2017-04-01 RX ADMIN — Medication 3 ML: at 02:04

## 2017-04-01 RX ADMIN — METOPROLOL TARTRATE 25 MG: 25 TABLET, FILM COATED ORAL at 09:04

## 2017-04-01 RX ADMIN — ALLOPURINOL 100 MG: 100 TABLET ORAL at 09:04

## 2017-04-01 RX ADMIN — ROSUVASTATIN CALCIUM 40 MG: 20 TABLET, FILM COATED ORAL at 09:04

## 2017-04-01 RX ADMIN — Medication 3 ML: at 06:04

## 2017-04-01 RX ADMIN — NITROGLYCERIN 1 PATCH: 0.4 PATCH TRANSDERMAL at 09:04

## 2017-04-01 RX ADMIN — EZETIMIBE 10 MG: 10 TABLET ORAL at 09:04

## 2017-04-01 RX ADMIN — ZOLPIDEM TARTRATE 5 MG: 5 TABLET, FILM COATED ORAL at 09:04

## 2017-04-01 NOTE — PROGRESS NOTES
"   03/31/17 2042   Vital Signs   Pulse 80   Heart Rate Source Monitor   Resp 18   SpO2 95 %   O2 Device (Oxygen Therapy) room air   /74   MAP (mmHg) 91   BP Location Left arm   BP Method Automatic   Patient Position Sitting     Pt complaining of  Non-radiating chest pain 2/10 that pt describes as "pressure." pt already has nitro patch on. Pt reports relief in chest pain after SLN, but no orders in epic. Spoke with Dr. Cerna, verbal orders received to give PRN sublingual nitro. Order carried out. Will continue to monitor patient.   "

## 2017-04-01 NOTE — PROGRESS NOTES
Spoke with patient about procedure scheduled for Monday, patient was able to tell me the specifics of what he knew so far,   Filled in some of the details patient was not 100% aware of.       Pt stated that he currently has kidney stones and he is worried about it being a problem after surgery he can feel the one on the left,  He said that it gets stuck in his ureter, also that he has concerns about what the Dr's will be able to do post op if the kidney stones get bad or become big problem.  Pt stated that he's had to have lithotripsy in the past.

## 2017-04-01 NOTE — PROGRESS NOTES
Progress Note  Cardiothoracic Surgery    Admit Date: 3/28/2017  Post-operative Day: 3 Days Post-Op  Hospital Day: 5    SUBJECTIVE:  No acute events overnight.  SR per monitor.     Follow-up For: Procedure(s) (LRB):  HEART CATH-LEFT (N/A)    Scheduled Meds:   allopurinol  100 mg Oral Daily    aspirin  81 mg Oral Daily    ezetimibe  10 mg Oral Daily    metoprolol tartrate  25 mg Oral BID    nitroGLYCERIN 0.4 MG/HR TD PT24  1 patch Transdermal Daily    rosuvastatin  40 mg Oral Daily    sodium chloride 0.9%  3 mL Intravenous Q8H     Infusions/Drips:   PRN Meds: nitroGLYCERIN, ondansetron, zolpidem    Review of patient's allergies indicates:   Allergen Reactions    Morpholine analogues Other (See Comments)       OBJECTIVE:     Vital Signs (Most Recent)  Temp: 97.8 °F (36.6 °C) (04/01/17 0400)  Pulse: 68 (04/01/17 1100)  Resp: 15 (04/01/17 0400)  BP: (!) 108/55 (04/01/17 0400)  SpO2: 96 % (04/01/17 0400)    Admission Weight: 104.3 kg (230 lb) (03/28/17 1816)   Most Recent Weight: 105.5 kg (232 lb 9.4 oz) (04/01/17 0400)    Vital Signs Range (Last 24H):  Temp:  [97.8 °F (36.6 °C)-98.2 °F (36.8 °C)]   Pulse:  [55-95]   Resp:  [15-18]   BP: (106-126)/(53-79)   SpO2:  [95 %-98 %]     I & O (Last 24H):  Intake/Output Summary (Last 24 hours) at 04/01/17 1115  Last data filed at 03/31/17 2200   Gross per 24 hour   Intake              880 ml   Output             1000 ml   Net             -120 ml     Physical Exam:  General: no distress  Head: normocephalic  Lungs:  clear to auscultation bilaterally and normal respiratory effort  Heart: regular rate and rhythm, S1, S2 normal, no murmur, rub or gallop  Abdomen: soft/nontender   Extremities: warm, well perfused  Skin: Skin color, texture, turgor normal. No rashes or lesions  Neurologic: Alert and oriented. Thought content appropriate      Laboratory:  CBC:   Recent Labs  Lab 04/01/17  0359   WBC 7.93   RBC 4.62   HGB 14.1   HCT 40.2      MCV 87   MCH 30.5   MCHC 35.1      BMP:   Recent Labs  Lab 04/01/17  0359   GLU 83      K 4.3      CO2 23   BUN 16   CREATININE 1.0   CALCIUM 8.9   MG 2.2       Diagnostic Results:  Carotid U/S    CONCLUSIONS   There is 20 - 39% right Internal Carotid stenosis.  There is 20 - 39% left Internal Carotid stenosis.      LHC:  - Left Main Coronary Artery:             The LM is normal. There is FAYE 3 flow.     - Left Anterior Descending Artery:             The proximal LAD has a 90% stenosis. There is FAYE 2 flow.             The mid LAD has chronic total occlusion. There is FAYE 0 flow. LAD wrap around. Fills late from RCA.     - Left Circumflex Artery:             The LCX has luminal irregularities. There is FAYE 3 flow.     - OM1:             The proximal OM1 has a 90% stenosis. There is FAYE 3 flow. Large branching vessel.     - Right Coronary Artery:             The distal RCA has a 80% stenosis. There is FAYE 3 flow. The remaining portion of the vessel has luminal irregularities.                     Lesion Details:   The length is 40mm.    ASSESSMENT/PLAN:     Assessment:   Active Hospital Problems    Diagnosis    *Non-ST elevation myocardial infarction (NSTEMI)     3/28/2017: NSTEMI. Troponin 0.2.       Familial hypercholesterolemia     3/29/2017: Diagnosed. Chol 381. .      Family history of ischemic heart disease     Mother received coronary stent at age 38.      Nephrolithiasis     2016: Diagnosed.      Hyperuricemia     2016: Diagnosed.      Coronary artery disease     3/28/2017: NSTEMI. Troponin 0.2.   3/29/2017: [facility]: Cath: LAD: Prox 95%. Mid: Occlusion. Wrap around. LCX: OM1 90%. RCA: Distal 80% segmental. LV: Anteroseptal mild hypokinesia. EF 50%.         Plan:   Ambulate  Monitor for chest pain  SL NTG prn  Cardiac diet  Pre op testing  Pre op teaching  Planning CABG on Monday by Dr. Allen

## 2017-04-01 NOTE — PROGRESS NOTES
Patient complaining of chest tightness, 2/10. 0.4mg nitro tab given. /83 map 101. Relieved with one nitro.

## 2017-04-01 NOTE — PROGRESS NOTES
Pt patient on 2L 02 initially for chest pain/pressure, patient reported that he feels a little stressed out as he has been talking with family about what happened etc... Instructed patient to keep it positive, light, and as stress free as possible.. rechecked patient 15 minutes later pt reported that his pressure had completely resolved and the 02 made him feel much better.      Informed patient to let me know if chest pain/pressure returns.

## 2017-04-01 NOTE — PLAN OF CARE
Problem: Patient Care Overview  Goal: Plan of Care Review  Outcome: Ongoing (interventions implemented as appropriate)  No acute events throughout night, pt was able to get some rest,  Pt did have CP (pressure 2/10) x2 episodes while talking about what is going on to family and friends, pt stated that he was feeling stressed.  2L 02 helped tremendously. Pt used as needed pt also aware of PRN SL nitro. Pt is informed of operative plan for Monday.  He is concerned about kidney stone he has not passed yet and pain management post operatively. VS and assessment per flow sheet, patient progressing towards goals as tolerated, plan of care reviewed with Se Denson and family, all concerns addressed, will continue to monitor.        Problem: Fall Risk (Adult)  Goal: Absence of Falls  Patient will demonstrate the desired outcomes by discharge/transition of care.   Outcome: Ongoing (interventions implemented as appropriate)  Pt remains free of injury and falls. All personal items within reach, call bell in reach, bed is lowest locked position, Non skid socks used while out of bed. Room free of clutter.

## 2017-04-02 LAB
ANION GAP SERPL CALC-SCNC: 8 MMOL/L
BASOPHILS # BLD AUTO: 0.03 K/UL
BASOPHILS NFR BLD: 0.4 %
BUN SERPL-MCNC: 17 MG/DL
CALCIUM SERPL-MCNC: 8.8 MG/DL
CHLORIDE SERPL-SCNC: 107 MMOL/L
CO2 SERPL-SCNC: 23 MMOL/L
CREAT SERPL-MCNC: 0.9 MG/DL
DIFFERENTIAL METHOD: NORMAL
EOSINOPHIL # BLD AUTO: 0.2 K/UL
EOSINOPHIL NFR BLD: 2.9 %
ERYTHROCYTE [DISTWIDTH] IN BLOOD BY AUTOMATED COUNT: 12.2 %
EST. GFR  (AFRICAN AMERICAN): >60 ML/MIN/1.73 M^2
EST. GFR  (NON AFRICAN AMERICAN): >60 ML/MIN/1.73 M^2
GLUCOSE SERPL-MCNC: 87 MG/DL
HCT VFR BLD AUTO: 41.7 %
HGB BLD-MCNC: 14.2 G/DL
LYMPHOCYTES # BLD AUTO: 2.8 K/UL
LYMPHOCYTES NFR BLD: 40.1 %
MAGNESIUM SERPL-MCNC: 2.1 MG/DL
MCH RBC QN AUTO: 30.3 PG
MCHC RBC AUTO-ENTMCNC: 34.1 %
MCV RBC AUTO: 89 FL
MONOCYTES # BLD AUTO: 0.6 K/UL
MONOCYTES NFR BLD: 8.6 %
NEUTROPHILS # BLD AUTO: 3.3 K/UL
NEUTROPHILS NFR BLD: 47.3 %
PHOSPHATE SERPL-MCNC: 4.2 MG/DL
PLATELET # BLD AUTO: 166 K/UL
PMV BLD AUTO: 11.1 FL
POTASSIUM SERPL-SCNC: 4.1 MMOL/L
RBC # BLD AUTO: 4.69 M/UL
SODIUM SERPL-SCNC: 138 MMOL/L
WBC # BLD AUTO: 6.88 K/UL

## 2017-04-02 PROCEDURE — 20600001 HC STEP DOWN PRIVATE ROOM

## 2017-04-02 PROCEDURE — 84100 ASSAY OF PHOSPHORUS: CPT

## 2017-04-02 PROCEDURE — 80048 BASIC METABOLIC PNL TOTAL CA: CPT

## 2017-04-02 PROCEDURE — 36415 COLL VENOUS BLD VENIPUNCTURE: CPT

## 2017-04-02 PROCEDURE — 25000003 PHARM REV CODE 250: Performed by: STUDENT IN AN ORGANIZED HEALTH CARE EDUCATION/TRAINING PROGRAM

## 2017-04-02 PROCEDURE — 25000003 PHARM REV CODE 250: Performed by: INTERNAL MEDICINE

## 2017-04-02 PROCEDURE — 25000003 PHARM REV CODE 250: Performed by: SURGERY

## 2017-04-02 PROCEDURE — 83735 ASSAY OF MAGNESIUM: CPT

## 2017-04-02 PROCEDURE — 25000003 PHARM REV CODE 250: Performed by: PHYSICIAN ASSISTANT

## 2017-04-02 PROCEDURE — 85025 COMPLETE CBC W/AUTO DIFF WBC: CPT

## 2017-04-02 RX ADMIN — Medication 3 ML: at 09:04

## 2017-04-02 RX ADMIN — ZOLPIDEM TARTRATE 5 MG: 5 TABLET, FILM COATED ORAL at 09:04

## 2017-04-02 RX ADMIN — ASPIRIN 81 MG: 81 TABLET, COATED ORAL at 09:04

## 2017-04-02 RX ADMIN — ALLOPURINOL 100 MG: 100 TABLET ORAL at 09:04

## 2017-04-02 RX ADMIN — METOPROLOL TARTRATE 25 MG: 25 TABLET, FILM COATED ORAL at 09:04

## 2017-04-02 RX ADMIN — Medication 3 ML: at 03:04

## 2017-04-02 RX ADMIN — Medication 3 ML: at 10:04

## 2017-04-02 RX ADMIN — NITROGLYCERIN 1 PATCH: 0.4 PATCH TRANSDERMAL at 09:04

## 2017-04-02 RX ADMIN — ROSUVASTATIN CALCIUM 40 MG: 20 TABLET, FILM COATED ORAL at 09:04

## 2017-04-02 RX ADMIN — EZETIMIBE 10 MG: 10 TABLET ORAL at 09:04

## 2017-04-02 RX ADMIN — NITROGLYCERIN 0.4 MG: 0.4 TABLET SUBLINGUAL at 11:04

## 2017-04-02 NOTE — PROGRESS NOTES
Pt states he is having second thoughts about CABG tomorrow, and would like to speak with a Dr. Pt states no one over went over [facility] results visually and he would like that as well as a second opinion with his cardiologist.  Notified Dr. Unger with [facility] states he will be by as soon as he is able to. Will continue to monitor.

## 2017-04-02 NOTE — PLAN OF CARE
Problem: Patient Care Overview  Goal: Plan of Care Review  Outcome: Ongoing (interventions implemented as appropriate)  Pt free of falls/traumas/injuries. Skin remains clean, dry, and intact. Pt remains free of chest pain. Pt educated on CABG.  Pt re-educated on importance of measuring accurate intake and out put; pt verbalized and demonstrates understanding. Reviewed plan of care with pt; and pt verbalized understanding.  Pt VSS in no distress will continue to monitor.

## 2017-04-02 NOTE — PROGRESS NOTES
Pt had small episode of chest tightness 2-10 that radiated down both arms upon walking back to the bed from the bathroom.  Mentioned that it was the same initial feeling he had when this all started.  Pt put 2L 02 on and got full relief within 3-5minutes.

## 2017-04-02 NOTE — PROGRESS NOTES
Progress Note  Cardiothoracic Surgery    Admit Date: 3/28/2017  Post-operative Day: 4 Days Post-Op  Hospital Day: 6    SUBJECTIVE:  No acute events overnight.  SR per monitor.     Follow-up For: Procedure(s) (LRB):  HEART CATH-LEFT (N/A)    Scheduled Meds:   allopurinol  100 mg Oral Daily    aspirin  81 mg Oral Daily    ezetimibe  10 mg Oral Daily    metoprolol tartrate  25 mg Oral BID    nitroGLYCERIN 0.4 MG/HR TD PT24  1 patch Transdermal Daily    rosuvastatin  40 mg Oral Daily    sodium chloride 0.9%  3 mL Intravenous Q8H     Infusions/Drips:   PRN Meds: nitroGLYCERIN, ondansetron, zolpidem    Review of patient's allergies indicates:   Allergen Reactions    Morpholine analogues Other (See Comments)       OBJECTIVE:     Vital Signs (Most Recent)  Temp: 98 °F (36.7 °C) (04/02/17 1200)  Pulse: 78 (04/02/17 1200)  Resp: 20 (04/02/17 1200)  BP: 137/73 (04/02/17 1200)  SpO2: 97 % (04/02/17 1200)    Admission Weight: 104.3 kg (230 lb) (03/28/17 1816)   Most Recent Weight: 103.3 kg (227 lb 11.8 oz) (04/02/17 0453)    Vital Signs Range (Last 24H):  Temp:  [97.9 °F (36.6 °C)-98.6 °F (37 °C)]   Pulse:  [53-85]   Resp:  [16-20]   BP: ()/(49-79)   SpO2:  [94 %-97 %]     I & O (Last 24H):    Intake/Output Summary (Last 24 hours) at 04/02/17 1243  Last data filed at 04/02/17 0453   Gross per 24 hour   Intake             1500 ml   Output             1450 ml   Net               50 ml     Physical Exam:  General: no distress  Head: normocephalic  Lungs:  clear to auscultation bilaterally and normal respiratory effort  Heart: regular rate and rhythm, S1, S2 normal, no murmur, rub or gallop  Abdomen: soft/nontender   Extremities: warm, well perfused  Skin: Skin color, texture, turgor normal. No rashes or lesions  Neurologic: Alert and oriented. Thought content appropriate      Laboratory:  CBC:     Recent Labs  Lab 04/02/17  0437   WBC 6.88   RBC 4.69   HGB 14.2   HCT 41.7      MCV 89   MCH 30.3   MCHC 34.1      BMP:     Recent Labs  Lab 04/02/17  0437   GLU 87      K 4.1      CO2 23   BUN 17   CREATININE 0.9   CALCIUM 8.8   MG 2.1       Diagnostic Results:  Carotid U/S    CONCLUSIONS   There is 20 - 39% right Internal Carotid stenosis.  There is 20 - 39% left Internal Carotid stenosis.      LHC:  - Left Main Coronary Artery:             The LM is normal. There is FAYE 3 flow.     - Left Anterior Descending Artery:             The proximal LAD has a 90% stenosis. There is FAYE 2 flow.             The mid LAD has chronic total occlusion. There is FAYE 0 flow. LAD wrap around. Fills late from RCA.     - Left Circumflex Artery:             The LCX has luminal irregularities. There is FAYE 3 flow.     - OM1:             The proximal OM1 has a 90% stenosis. There is FAYE 3 flow. Large branching vessel.     - Right Coronary Artery:             The distal RCA has a 80% stenosis. There is FAYE 3 flow. The remaining portion of the vessel has luminal irregularities.                     Lesion Details:   The length is 40mm.    ASSESSMENT/PLAN:     Assessment:   Active Hospital Problems    Diagnosis    *Non-ST elevation myocardial infarction (NSTEMI)     3/28/2017: NSTEMI. Troponin 0.2.       Familial hypercholesterolemia     3/29/2017: Diagnosed. Chol 381. .      Family history of ischemic heart disease     Mother received coronary stent at age 38.      Nephrolithiasis     2016: Diagnosed.      Hyperuricemia     2016: Diagnosed.      Coronary artery disease     3/28/2017: NSTEMI. Troponin 0.2.   3/29/2017: [facility] [place]: Cath: LAD: Prox 95%. Mid: Occlusion. Wrap around. LCX: OM1 90%. RCA: Distal 80% segmental. LV: Anteroseptal mild hypokinesia. EF 50%.         Plan:   Ambulate  Monitor for chest pain  SL NTG prn  Cardiac diet  Pre op testing  Pre op teaching  Planning CABG on Monday by Dr. Allen

## 2017-04-03 ENCOUNTER — ANESTHESIA (OUTPATIENT)
Dept: SURGERY | Facility: HOSPITAL | Age: 47
DRG: 234 | End: 2017-04-03
Payer: COMMERCIAL

## 2017-04-03 LAB
ABO + RH BLD: NORMAL
ALLENS TEST: ABNORMAL
ANION GAP SERPL CALC-SCNC: 7 MMOL/L
APTT BLDCRRT: 26.4 SEC
BASOPHILS # BLD AUTO: 0.02 K/UL
BASOPHILS NFR BLD: 0.2 %
BLD GP AB SCN CELLS X3 SERPL QL: NORMAL
BUN SERPL-MCNC: 15 MG/DL
CALCIUM SERPL-MCNC: 7.3 MG/DL
CHLORIDE SERPL-SCNC: 109 MMOL/L
CO2 SERPL-SCNC: 25 MMOL/L
CREAT SERPL-MCNC: 0.8 MG/DL
DELSYS: ABNORMAL
DIFFERENTIAL METHOD: ABNORMAL
EOSINOPHIL # BLD AUTO: 0.2 K/UL
EOSINOPHIL NFR BLD: 1.8 %
ERYTHROCYTE [DISTWIDTH] IN BLOOD BY AUTOMATED COUNT: 12 %
ERYTHROCYTE [SEDIMENTATION RATE] IN BLOOD BY WESTERGREN METHOD: 12 MM/H
EST. GFR  (AFRICAN AMERICAN): >60 ML/MIN/1.73 M^2
EST. GFR  (NON AFRICAN AMERICAN): >60 ML/MIN/1.73 M^2
FIO2: 60
FLOW: 65
GLUCOSE SERPL-MCNC: 103 MG/DL (ref 70–110)
GLUCOSE SERPL-MCNC: 109 MG/DL (ref 70–110)
GLUCOSE SERPL-MCNC: 118 MG/DL (ref 70–110)
GLUCOSE SERPL-MCNC: 95 MG/DL
HCO3 UR-SCNC: 20.7 MMOL/L (ref 24–28)
HCO3 UR-SCNC: 22.1 MMOL/L (ref 24–28)
HCO3 UR-SCNC: 23.7 MMOL/L (ref 24–28)
HCO3 UR-SCNC: 24.8 MMOL/L (ref 24–28)
HCT VFR BLD AUTO: 35 %
HCT VFR BLD CALC: 29 %PCV (ref 36–54)
HCT VFR BLD CALC: 33 %PCV (ref 36–54)
HCT VFR BLD CALC: 36 %PCV (ref 36–54)
HCT VFR BLD CALC: 36 %PCV (ref 36–54)
HGB BLD-MCNC: 12.6 G/DL
INR PPP: 1
LYMPHOCYTES # BLD AUTO: 1.7 K/UL
LYMPHOCYTES NFR BLD: 16.8 %
MAGNESIUM SERPL-MCNC: 2.3 MG/DL
MCH RBC QN AUTO: 31.2 PG
MCHC RBC AUTO-ENTMCNC: 36 %
MCV RBC AUTO: 87 FL
MODE: ABNORMAL
MONOCYTES # BLD AUTO: 0.8 K/UL
MONOCYTES NFR BLD: 8.1 %
NEUTROPHILS # BLD AUTO: 7.4 K/UL
NEUTROPHILS NFR BLD: 72.7 %
PCO2 BLDA: 37.2 MMHG (ref 35–45)
PCO2 BLDA: 38.5 MMHG (ref 35–45)
PCO2 BLDA: 40.2 MMHG (ref 35–45)
PCO2 BLDA: 48.2 MMHG (ref 35–45)
PEEP: 5
PH SMN: 7.32 [PH] (ref 7.35–7.45)
PH SMN: 7.35 [PH] (ref 7.35–7.45)
PH SMN: 7.37 [PH] (ref 7.35–7.45)
PH SMN: 7.38 [PH] (ref 7.35–7.45)
PHOSPHATE SERPL-MCNC: 3.4 MG/DL
PLATELET # BLD AUTO: 111 K/UL
PMV BLD AUTO: 10.9 FL
PO2 BLDA: 101 MMHG (ref 80–100)
PO2 BLDA: 119 MMHG (ref 80–100)
PO2 BLDA: 172 MMHG (ref 80–100)
PO2 BLDA: 210 MMHG (ref 80–100)
POC ACTIVATED CLOTTING TIME K: 126 SEC (ref 74–137)
POC BE: -1 MMOL/L
POC BE: -1 MMOL/L
POC BE: -3 MMOL/L
POC BE: -5 MMOL/L
POC IONIZED CALCIUM: 1.05 MMOL/L (ref 1.06–1.42)
POC IONIZED CALCIUM: 1.08 MMOL/L (ref 1.06–1.42)
POC IONIZED CALCIUM: 1.12 MMOL/L (ref 1.06–1.42)
POC IONIZED CALCIUM: 1.13 MMOL/L (ref 1.06–1.42)
POC SATURATED O2: 100 % (ref 95–100)
POC SATURATED O2: 98 % (ref 95–100)
POC SATURATED O2: 99 % (ref 95–100)
POC SATURATED O2: 99 % (ref 95–100)
POC TCO2: 22 MMOL/L (ref 23–27)
POC TCO2: 23 MMOL/L (ref 23–27)
POC TCO2: 25 MMOL/L (ref 23–27)
POC TCO2: 26 MMOL/L (ref 23–27)
POCT GLUCOSE: 111 MG/DL (ref 70–110)
POCT GLUCOSE: 112 MG/DL (ref 70–110)
POCT GLUCOSE: 116 MG/DL (ref 70–110)
POCT GLUCOSE: 118 MG/DL (ref 70–110)
POCT GLUCOSE: 127 MG/DL (ref 70–110)
POCT GLUCOSE: 86 MG/DL (ref 70–110)
POTASSIUM BLD-SCNC: 3.7 MMOL/L (ref 3.5–5.1)
POTASSIUM BLD-SCNC: 4.2 MMOL/L (ref 3.5–5.1)
POTASSIUM BLD-SCNC: 4.4 MMOL/L (ref 3.5–5.1)
POTASSIUM BLD-SCNC: 4.4 MMOL/L (ref 3.5–5.1)
POTASSIUM SERPL-SCNC: 3.9 MMOL/L
POTASSIUM SERPL-SCNC: 3.9 MMOL/L
PROTHROMBIN TIME: 10.9 SEC
PS: 10
RBC # BLD AUTO: 4.04 M/UL
SAMPLE: ABNORMAL
SAMPLE: NORMAL
SITE: ABNORMAL
SODIUM BLD-SCNC: 139 MMOL/L (ref 136–145)
SODIUM BLD-SCNC: 139 MMOL/L (ref 136–145)
SODIUM BLD-SCNC: 140 MMOL/L (ref 136–145)
SODIUM BLD-SCNC: 142 MMOL/L (ref 136–145)
SODIUM SERPL-SCNC: 141 MMOL/L
VT: 600
WBC # BLD AUTO: 10.19 K/UL

## 2017-04-03 PROCEDURE — 25000003 PHARM REV CODE 250: Performed by: SURGERY

## 2017-04-03 PROCEDURE — 02100Z8 BYPASS CORONARY ARTERY, ONE ARTERY FROM RIGHT INTERNAL MAMMARY, OPEN APPROACH: ICD-10-PCS | Performed by: THORACIC SURGERY (CARDIOTHORACIC VASCULAR SURGERY)

## 2017-04-03 PROCEDURE — 37000009 HC ANESTHESIA EA ADD 15 MINS: Performed by: THORACIC SURGERY (CARDIOTHORACIC VASCULAR SURGERY)

## 2017-04-03 PROCEDURE — 82330 ASSAY OF CALCIUM: CPT

## 2017-04-03 PROCEDURE — 20000000 HC ICU ROOM

## 2017-04-03 PROCEDURE — 85610 PROTHROMBIN TIME: CPT

## 2017-04-03 PROCEDURE — 86850 RBC ANTIBODY SCREEN: CPT

## 2017-04-03 PROCEDURE — 86900 BLOOD TYPING SEROLOGIC ABO: CPT

## 2017-04-03 PROCEDURE — 76937 US GUIDE VASCULAR ACCESS: CPT | Mod: 26,,, | Performed by: ANESTHESIOLOGY

## 2017-04-03 PROCEDURE — 63600175 PHARM REV CODE 636 W HCPCS: Performed by: THORACIC SURGERY (CARDIOTHORACIC VASCULAR SURGERY)

## 2017-04-03 PROCEDURE — 27200966 HC CLOSED SUCTION SYSTEM

## 2017-04-03 PROCEDURE — 27000221 HC OXYGEN, UP TO 24 HOURS

## 2017-04-03 PROCEDURE — 5A1221Z PERFORMANCE OF CARDIAC OUTPUT, CONTINUOUS: ICD-10-PCS | Performed by: THORACIC SURGERY (CARDIOTHORACIC VASCULAR SURGERY)

## 2017-04-03 PROCEDURE — 99900035 HC TECH TIME PER 15 MIN (STAT)

## 2017-04-03 PROCEDURE — 25000003 PHARM REV CODE 250: Performed by: THORACIC SURGERY (CARDIOTHORACIC VASCULAR SURGERY)

## 2017-04-03 PROCEDURE — 82803 BLOOD GASES ANY COMBINATION: CPT

## 2017-04-03 PROCEDURE — P9045 ALBUMIN (HUMAN), 5%, 250 ML: HCPCS | Performed by: GENERAL PRACTICE

## 2017-04-03 PROCEDURE — 37000008 HC ANESTHESIA 1ST 15 MINUTES: Performed by: THORACIC SURGERY (CARDIOTHORACIC VASCULAR SURGERY)

## 2017-04-03 PROCEDURE — 85014 HEMATOCRIT: CPT

## 2017-04-03 PROCEDURE — 94799 UNLISTED PULMONARY SVC/PX: CPT

## 2017-04-03 PROCEDURE — C1751 CATH, INF, PER/CENT/MIDLINE: HCPCS | Performed by: ANESTHESIOLOGY

## 2017-04-03 PROCEDURE — 36556 INSERT NON-TUNNEL CV CATH: CPT | Mod: 59,,, | Performed by: ANESTHESIOLOGY

## 2017-04-03 PROCEDURE — 63600175 PHARM REV CODE 636 W HCPCS

## 2017-04-03 PROCEDURE — 36592 COLLECT BLOOD FROM PICC: CPT

## 2017-04-03 PROCEDURE — 27200678 HC TRANSDUCER MONITOR KIT TRIPLE: Performed by: ANESTHESIOLOGY

## 2017-04-03 PROCEDURE — 63600175 PHARM REV CODE 636 W HCPCS: Performed by: GENERAL PRACTICE

## 2017-04-03 PROCEDURE — 84295 ASSAY OF SERUM SODIUM: CPT

## 2017-04-03 PROCEDURE — 27100019 HC AMBU BAG ADULT/PED: Performed by: ANESTHESIOLOGY

## 2017-04-03 PROCEDURE — 80048 BASIC METABOLIC PNL TOTAL CA: CPT

## 2017-04-03 PROCEDURE — 99900017 HC EXTUBATION W/PARAMETERS (STAT)

## 2017-04-03 PROCEDURE — 93005 ELECTROCARDIOGRAM TRACING: CPT

## 2017-04-03 PROCEDURE — 27100025 HC TUBING, SET FLUID WARMER: Performed by: ANESTHESIOLOGY

## 2017-04-03 PROCEDURE — 94761 N-INVAS EAR/PLS OXIMETRY MLT: CPT

## 2017-04-03 PROCEDURE — 93312 ECHO TRANSESOPHAGEAL: CPT | Mod: 26,59,, | Performed by: ANESTHESIOLOGY

## 2017-04-03 PROCEDURE — 25000003 PHARM REV CODE 250: Performed by: GENERAL PRACTICE

## 2017-04-03 PROCEDURE — 27000191 HC C-V MONITORING

## 2017-04-03 PROCEDURE — 94002 VENT MGMT INPAT INIT DAY: CPT

## 2017-04-03 PROCEDURE — 84132 ASSAY OF SERUM POTASSIUM: CPT

## 2017-04-03 PROCEDURE — 27200953 HC CARDIOPLEGIA SYSTEM

## 2017-04-03 PROCEDURE — 02100Z9 BYPASS CORONARY ARTERY, ONE ARTERY FROM LEFT INTERNAL MAMMARY, OPEN APPROACH: ICD-10-PCS | Performed by: THORACIC SURGERY (CARDIOTHORACIC VASCULAR SURGERY)

## 2017-04-03 PROCEDURE — 63600175 PHARM REV CODE 636 W HCPCS: Performed by: ANESTHESIOLOGY

## 2017-04-03 PROCEDURE — 27201423 OPTIME MED/SURG SUP & DEVICES STERILE SUPPLY: Performed by: THORACIC SURGERY (CARDIOTHORACIC VASCULAR SURGERY)

## 2017-04-03 PROCEDURE — 85730 THROMBOPLASTIN TIME PARTIAL: CPT

## 2017-04-03 PROCEDURE — 83735 ASSAY OF MAGNESIUM: CPT

## 2017-04-03 PROCEDURE — 33534 CABG ARTERIAL TWO: CPT | Mod: ,,, | Performed by: THORACIC SURGERY (CARDIOTHORACIC VASCULAR SURGERY)

## 2017-04-03 PROCEDURE — C1729 CATH, DRAINAGE: HCPCS | Performed by: THORACIC SURGERY (CARDIOTHORACIC VASCULAR SURGERY)

## 2017-04-03 PROCEDURE — 27201037 HC PRESSURE MONITORING SET UP

## 2017-04-03 PROCEDURE — 36620 INSERTION CATHETER ARTERY: CPT | Mod: 59,,, | Performed by: ANESTHESIOLOGY

## 2017-04-03 PROCEDURE — 27000175 HC ADULT BYPASS PUMP

## 2017-04-03 PROCEDURE — 84100 ASSAY OF PHOSPHORUS: CPT

## 2017-04-03 PROCEDURE — 37799 UNLISTED PX VASCULAR SURGERY: CPT

## 2017-04-03 PROCEDURE — 25000003 PHARM REV CODE 250: Performed by: ANESTHESIOLOGY

## 2017-04-03 PROCEDURE — 27100021 HC MULTIPORT INFUSION MANIFOLD: Performed by: ANESTHESIOLOGY

## 2017-04-03 PROCEDURE — D9220A PRA ANESTHESIA: Mod: ,,, | Performed by: ANESTHESIOLOGY

## 2017-04-03 PROCEDURE — 85520 HEPARIN ASSAY: CPT

## 2017-04-03 PROCEDURE — 36000712 HC OR TIME LEV V 1ST 15 MIN: Performed by: THORACIC SURGERY (CARDIOTHORACIC VASCULAR SURGERY)

## 2017-04-03 PROCEDURE — 27100088 HC CELL SAVER

## 2017-04-03 PROCEDURE — 85025 COMPLETE CBC W/AUTO DIFF WBC: CPT

## 2017-04-03 PROCEDURE — 25000003 PHARM REV CODE 250: Performed by: STUDENT IN AN ORGANIZED HEALTH CARE EDUCATION/TRAINING PROGRAM

## 2017-04-03 PROCEDURE — 36000713 HC OR TIME LEV V EA ADD 15 MIN: Performed by: THORACIC SURGERY (CARDIOTHORACIC VASCULAR SURGERY)

## 2017-04-03 RX ORDER — MUPIROCIN 20 MG/G
1 OINTMENT TOPICAL 2 TIMES DAILY
Status: DISCONTINUED | OUTPATIENT
Start: 2017-04-03 | End: 2017-04-06

## 2017-04-03 RX ORDER — FENTANYL CITRATE 50 UG/ML
INJECTION, SOLUTION INTRAMUSCULAR; INTRAVENOUS
Status: DISCONTINUED | OUTPATIENT
Start: 2017-04-03 | End: 2017-04-03

## 2017-04-03 RX ORDER — ESMOLOL HYDROCHLORIDE 10 MG/ML
INJECTION INTRAVENOUS
Status: DISCONTINUED | OUTPATIENT
Start: 2017-04-03 | End: 2017-04-03

## 2017-04-03 RX ORDER — FENTANYL CITRATE 50 UG/ML
INJECTION, SOLUTION INTRAMUSCULAR; INTRAVENOUS
Status: COMPLETED
Start: 2017-04-03 | End: 2017-04-03

## 2017-04-03 RX ORDER — MORPHINE SULFATE 2 MG/ML
2 INJECTION, SOLUTION INTRAMUSCULAR; INTRAVENOUS
Status: DISCONTINUED | OUTPATIENT
Start: 2017-04-03 | End: 2017-04-03

## 2017-04-03 RX ORDER — PHENYLEPHRINE HYDROCHLORIDE 10 MG/ML
INJECTION INTRAVENOUS
Status: DISCONTINUED | OUTPATIENT
Start: 2017-04-03 | End: 2017-04-03

## 2017-04-03 RX ORDER — SODIUM CHLORIDE 9 MG/ML
INJECTION, SOLUTION INTRAVENOUS CONTINUOUS PRN
Status: DISCONTINUED | OUTPATIENT
Start: 2017-04-03 | End: 2017-04-03

## 2017-04-03 RX ORDER — MAGNESIUM SULFATE HEPTAHYDRATE 40 MG/ML
2 INJECTION, SOLUTION INTRAVENOUS
Status: DISCONTINUED | OUTPATIENT
Start: 2017-04-03 | End: 2017-04-04

## 2017-04-03 RX ORDER — POTASSIUM CHLORIDE 14.9 MG/ML
60 INJECTION INTRAVENOUS
Status: DISCONTINUED | OUTPATIENT
Start: 2017-04-03 | End: 2017-04-04

## 2017-04-03 RX ORDER — PROPOFOL 10 MG/ML
5 INJECTION, EMULSION INTRAVENOUS CONTINUOUS
Status: DISCONTINUED | OUTPATIENT
Start: 2017-04-03 | End: 2017-04-03

## 2017-04-03 RX ORDER — FENTANYL CITRATE 50 UG/ML
100 INJECTION, SOLUTION INTRAMUSCULAR; INTRAVENOUS ONCE
Status: COMPLETED | OUTPATIENT
Start: 2017-04-03 | End: 2017-04-03

## 2017-04-03 RX ORDER — BACITRACIN 50000 [IU]/1
INJECTION, POWDER, FOR SOLUTION INTRAMUSCULAR
Status: DISCONTINUED | OUTPATIENT
Start: 2017-04-03 | End: 2017-04-03 | Stop reason: HOSPADM

## 2017-04-03 RX ORDER — DEXTROSE MONOHYDRATE, SODIUM CHLORIDE, AND POTASSIUM CHLORIDE 50; 1.49; 4.5 G/1000ML; G/1000ML; G/1000ML
INJECTION, SOLUTION INTRAVENOUS CONTINUOUS
Status: DISCONTINUED | OUTPATIENT
Start: 2017-04-03 | End: 2017-04-04

## 2017-04-03 RX ORDER — OXYCODONE HYDROCHLORIDE 5 MG/1
5 TABLET ORAL EVERY 4 HOURS PRN
Status: DISCONTINUED | OUTPATIENT
Start: 2017-04-03 | End: 2017-04-04

## 2017-04-03 RX ORDER — PROTAMINE SULFATE 10 MG/ML
INJECTION, SOLUTION INTRAVENOUS
Status: DISCONTINUED | OUTPATIENT
Start: 2017-04-03 | End: 2017-04-03

## 2017-04-03 RX ORDER — POTASSIUM CHLORIDE 29.8 MG/ML
40 INJECTION INTRAVENOUS
Status: DISCONTINUED | OUTPATIENT
Start: 2017-04-03 | End: 2017-04-04

## 2017-04-03 RX ORDER — HYDROMORPHONE HYDROCHLORIDE 1 MG/ML
1 INJECTION, SOLUTION INTRAMUSCULAR; INTRAVENOUS; SUBCUTANEOUS EVERY 4 HOURS PRN
Status: DISCONTINUED | OUTPATIENT
Start: 2017-04-03 | End: 2017-04-04

## 2017-04-03 RX ORDER — ONDANSETRON 2 MG/ML
4 INJECTION INTRAMUSCULAR; INTRAVENOUS EVERY 12 HOURS PRN
Status: DISCONTINUED | OUTPATIENT
Start: 2017-04-03 | End: 2017-04-07

## 2017-04-03 RX ORDER — OXYCODONE HYDROCHLORIDE 5 MG/1
10 TABLET ORAL EVERY 4 HOURS PRN
Status: DISCONTINUED | OUTPATIENT
Start: 2017-04-03 | End: 2017-04-04

## 2017-04-03 RX ORDER — PROPOFOL 10 MG/ML
VIAL (ML) INTRAVENOUS CONTINUOUS PRN
Status: DISCONTINUED | OUTPATIENT
Start: 2017-04-03 | End: 2017-04-03

## 2017-04-03 RX ORDER — ETOMIDATE 2 MG/ML
INJECTION INTRAVENOUS
Status: DISCONTINUED | OUTPATIENT
Start: 2017-04-03 | End: 2017-04-03

## 2017-04-03 RX ORDER — NICARDIPINE HYDROCHLORIDE 0.2 MG/ML
INJECTION INTRAVENOUS
Status: DISPENSED
Start: 2017-04-03 | End: 2017-04-04

## 2017-04-03 RX ORDER — CEFAZOLIN SODIUM 2 G/50ML
2 SOLUTION INTRAVENOUS
Status: COMPLETED | OUTPATIENT
Start: 2017-04-03 | End: 2017-04-04

## 2017-04-03 RX ORDER — ACETAMINOPHEN 10 MG/ML
INJECTION, SOLUTION INTRAVENOUS
Status: DISCONTINUED | OUTPATIENT
Start: 2017-04-03 | End: 2017-04-03

## 2017-04-03 RX ORDER — HEPARIN SODIUM 1000 [USP'U]/ML
INJECTION, SOLUTION INTRAVENOUS; SUBCUTANEOUS
Status: DISCONTINUED | OUTPATIENT
Start: 2017-04-03 | End: 2017-04-03

## 2017-04-03 RX ORDER — ROCURONIUM BROMIDE 10 MG/ML
INJECTION, SOLUTION INTRAVENOUS
Status: DISCONTINUED | OUTPATIENT
Start: 2017-04-03 | End: 2017-04-03

## 2017-04-03 RX ORDER — HEPARIN SODIUM 1000 [USP'U]/ML
INJECTION, SOLUTION INTRAVENOUS; SUBCUTANEOUS
Status: DISCONTINUED | OUTPATIENT
Start: 2017-04-03 | End: 2017-04-03 | Stop reason: HOSPADM

## 2017-04-03 RX ORDER — HYDROMORPHONE HYDROCHLORIDE 1 MG/ML
0.5 INJECTION, SOLUTION INTRAMUSCULAR; INTRAVENOUS; SUBCUTANEOUS ONCE
Status: DISCONTINUED | OUTPATIENT
Start: 2017-04-03 | End: 2017-04-04

## 2017-04-03 RX ORDER — ALBUMIN HUMAN 50 G/1000ML
SOLUTION INTRAVENOUS
Status: DISPENSED
Start: 2017-04-03 | End: 2017-04-04

## 2017-04-03 RX ORDER — NICARDIPINE HYDROCHLORIDE 0.2 MG/ML
5 INJECTION INTRAVENOUS CONTINUOUS
Status: DISCONTINUED | OUTPATIENT
Start: 2017-04-03 | End: 2017-04-04

## 2017-04-03 RX ORDER — ACETAMINOPHEN 10 MG/ML
1000 INJECTION, SOLUTION INTRAVENOUS EVERY 8 HOURS
Status: COMPLETED | OUTPATIENT
Start: 2017-04-03 | End: 2017-04-04

## 2017-04-03 RX ORDER — ASPIRIN 325 MG
325 TABLET ORAL DAILY
Status: DISCONTINUED | OUTPATIENT
Start: 2017-04-04 | End: 2017-04-04

## 2017-04-03 RX ORDER — AMINOCAPROIC ACID 250 MG/ML
INJECTION, SOLUTION INTRAVENOUS
Status: DISCONTINUED | OUTPATIENT
Start: 2017-04-03 | End: 2017-04-03

## 2017-04-03 RX ORDER — POTASSIUM CHLORIDE 14.9 MG/ML
20 INJECTION INTRAVENOUS
Status: DISCONTINUED | OUTPATIENT
Start: 2017-04-03 | End: 2017-04-04

## 2017-04-03 RX ORDER — NOREPINEPHRINE BITARTRATE/D5W 4MG/250ML
0.02 PLASTIC BAG, INJECTION (ML) INTRAVENOUS CONTINUOUS
Status: DISCONTINUED | OUTPATIENT
Start: 2017-04-03 | End: 2017-04-04

## 2017-04-03 RX ORDER — PAPAVERINE HYDROCHLORIDE 30 MG/ML
INJECTION INTRAMUSCULAR; INTRAVENOUS
Status: DISCONTINUED | OUTPATIENT
Start: 2017-04-03 | End: 2017-04-03 | Stop reason: HOSPADM

## 2017-04-03 RX ORDER — LIDOCAINE HCL/PF 100 MG/5ML
SYRINGE (ML) INTRAVENOUS
Status: DISCONTINUED | OUTPATIENT
Start: 2017-04-03 | End: 2017-04-03

## 2017-04-03 RX ORDER — NITROGLYCERIN 5 MG/ML
INJECTION, SOLUTION INTRAVENOUS
Status: DISCONTINUED | OUTPATIENT
Start: 2017-04-03 | End: 2017-04-03

## 2017-04-03 RX ORDER — MIDAZOLAM HYDROCHLORIDE 1 MG/ML
INJECTION, SOLUTION INTRAMUSCULAR; INTRAVENOUS
Status: DISCONTINUED | OUTPATIENT
Start: 2017-04-03 | End: 2017-04-03

## 2017-04-03 RX ORDER — ASPIRIN 325 MG
TABLET ORAL
Status: DISPENSED
Start: 2017-04-03 | End: 2017-04-04

## 2017-04-03 RX ORDER — ASPIRIN 325 MG
325 TABLET ORAL ONCE
Status: COMPLETED | OUTPATIENT
Start: 2017-04-03 | End: 2017-04-03

## 2017-04-03 RX ORDER — ALBUMIN HUMAN 50 G/1000ML
25 SOLUTION INTRAVENOUS ONCE
Status: COMPLETED | OUTPATIENT
Start: 2017-04-03 | End: 2017-04-03

## 2017-04-03 RX ORDER — MORPHINE SULFATE 2 MG/ML
4 INJECTION, SOLUTION INTRAMUSCULAR; INTRAVENOUS
Status: DISCONTINUED | OUTPATIENT
Start: 2017-04-03 | End: 2017-04-03

## 2017-04-03 RX ADMIN — ROCURONIUM BROMIDE 50 MG: 10 INJECTION, SOLUTION INTRAVENOUS at 10:04

## 2017-04-03 RX ADMIN — FENTANYL CITRATE 150 MCG: 50 INJECTION, SOLUTION INTRAMUSCULAR; INTRAVENOUS at 08:04

## 2017-04-03 RX ADMIN — NITROGLYCERIN 50 MCG: 5 INJECTION, SOLUTION INTRAVENOUS at 11:04

## 2017-04-03 RX ADMIN — PROTAMINE SULFATE 320 MG: 10 INJECTION, SOLUTION INTRAVENOUS at 11:04

## 2017-04-03 RX ADMIN — MIDAZOLAM HYDROCHLORIDE 2 MG: 1 INJECTION, SOLUTION INTRAMUSCULAR; INTRAVENOUS at 07:04

## 2017-04-03 RX ADMIN — ROCURONIUM BROMIDE 50 MG: 10 INJECTION, SOLUTION INTRAVENOUS at 08:04

## 2017-04-03 RX ADMIN — SODIUM CHLORIDE, SODIUM GLUCONATE, SODIUM ACETATE, POTASSIUM CHLORIDE, MAGNESIUM CHLORIDE, SODIUM PHOSPHATE, DIBASIC, AND POTASSIUM PHOSPHATE: .53; .5; .37; .037; .03; .012; .00082 INJECTION, SOLUTION INTRAVENOUS at 11:04

## 2017-04-03 RX ADMIN — ETOMIDATE 10 MG: 2 INJECTION, SOLUTION INTRAVENOUS at 07:04

## 2017-04-03 RX ADMIN — PHENYLEPHRINE HYDROCHLORIDE 50 MCG: 10 INJECTION INTRAVENOUS at 09:04

## 2017-04-03 RX ADMIN — FENTANYL CITRATE 200 MCG: 50 INJECTION, SOLUTION INTRAMUSCULAR; INTRAVENOUS at 07:04

## 2017-04-03 RX ADMIN — SODIUM CHLORIDE 1 UNITS/HR: 9 INJECTION, SOLUTION INTRAVENOUS at 04:04

## 2017-04-03 RX ADMIN — CEFAZOLIN SODIUM 2 G: 2 SOLUTION INTRAVENOUS at 04:04

## 2017-04-03 RX ADMIN — ETOMIDATE 8 MG: 2 INJECTION, SOLUTION INTRAVENOUS at 07:04

## 2017-04-03 RX ADMIN — AMINOCAPROIC ACID 1 G/HR: 250 INJECTION, SOLUTION INTRAVENOUS at 08:04

## 2017-04-03 RX ADMIN — POTASSIUM CHLORIDE 20 MEQ: 200 INJECTION, SOLUTION INTRAVENOUS at 02:04

## 2017-04-03 RX ADMIN — ASPIRIN 325 MG ORAL TABLET 325 MG: 325 PILL ORAL at 04:04

## 2017-04-03 RX ADMIN — SODIUM CHLORIDE, SODIUM GLUCONATE, SODIUM ACETATE, POTASSIUM CHLORIDE, MAGNESIUM CHLORIDE, SODIUM PHOSPHATE, DIBASIC, AND POTASSIUM PHOSPHATE: .53; .5; .37; .037; .03; .012; .00082 INJECTION, SOLUTION INTRAVENOUS at 08:04

## 2017-04-03 RX ADMIN — ROCURONIUM BROMIDE 50 MG: 10 INJECTION, SOLUTION INTRAVENOUS at 11:04

## 2017-04-03 RX ADMIN — PHENYLEPHRINE HYDROCHLORIDE 50 MCG: 10 INJECTION INTRAVENOUS at 08:04

## 2017-04-03 RX ADMIN — Medication 3 ML: at 10:04

## 2017-04-03 RX ADMIN — MORPHINE SULFATE 4 MG: 2 INJECTION, SOLUTION INTRAMUSCULAR; INTRAVENOUS at 02:04

## 2017-04-03 RX ADMIN — MIDAZOLAM HYDROCHLORIDE 3 MG: 1 INJECTION, SOLUTION INTRAMUSCULAR; INTRAVENOUS at 07:04

## 2017-04-03 RX ADMIN — HYDROMORPHONE HYDROCHLORIDE 1 MG: 1 INJECTION, SOLUTION INTRAMUSCULAR; INTRAVENOUS; SUBCUTANEOUS at 07:04

## 2017-04-03 RX ADMIN — DEXTROSE MONOHYDRATE, SODIUM CHLORIDE, AND POTASSIUM CHLORIDE: 50; 4.5; 1.49 INJECTION, SOLUTION INTRAVENOUS at 03:04

## 2017-04-03 RX ADMIN — FENTANYL CITRATE 250 MCG: 50 INJECTION, SOLUTION INTRAMUSCULAR; INTRAVENOUS at 11:04

## 2017-04-03 RX ADMIN — ONDANSETRON 4 MG: 2 INJECTION INTRAMUSCULAR; INTRAVENOUS at 02:04

## 2017-04-03 RX ADMIN — HEPARIN SODIUM 35000 UNITS: 1000 INJECTION, SOLUTION INTRAVENOUS; SUBCUTANEOUS at 10:04

## 2017-04-03 RX ADMIN — ROCURONIUM BROMIDE 60 MG: 10 INJECTION, SOLUTION INTRAVENOUS at 07:04

## 2017-04-03 RX ADMIN — LIDOCAINE HYDROCHLORIDE 100 MG: 20 INJECTION, SOLUTION INTRAVENOUS at 07:04

## 2017-04-03 RX ADMIN — MIDAZOLAM HYDROCHLORIDE 1 MG: 1 INJECTION, SOLUTION INTRAMUSCULAR; INTRAVENOUS at 11:04

## 2017-04-03 RX ADMIN — MORPHINE SULFATE 2 MG: 2 INJECTION, SOLUTION INTRAMUSCULAR; INTRAVENOUS at 01:04

## 2017-04-03 RX ADMIN — ROCURONIUM BROMIDE 40 MG: 10 INJECTION, SOLUTION INTRAVENOUS at 08:04

## 2017-04-03 RX ADMIN — ACETAMINOPHEN 1000 MG: 10 INJECTION, SOLUTION INTRAVENOUS at 06:04

## 2017-04-03 RX ADMIN — OXYCODONE HYDROCHLORIDE 10 MG: 5 TABLET ORAL at 10:04

## 2017-04-03 RX ADMIN — OXYCODONE HYDROCHLORIDE 10 MG: 5 TABLET ORAL at 04:04

## 2017-04-03 RX ADMIN — MIDAZOLAM HYDROCHLORIDE 2 MG: 1 INJECTION, SOLUTION INTRAMUSCULAR; INTRAVENOUS at 10:04

## 2017-04-03 RX ADMIN — ACETAMINOPHEN 1000 MG: 10 INJECTION, SOLUTION INTRAVENOUS at 12:04

## 2017-04-03 RX ADMIN — Medication 3 ML: at 05:04

## 2017-04-03 RX ADMIN — ALBUMIN (HUMAN) 25 G: 12.5 SOLUTION INTRAVENOUS at 03:04

## 2017-04-03 RX ADMIN — PHENYLEPHRINE HYDROCHLORIDE 100 MCG: 10 INJECTION INTRAVENOUS at 10:04

## 2017-04-03 RX ADMIN — FENTANYL CITRATE 250 MCG: 50 INJECTION, SOLUTION INTRAMUSCULAR; INTRAVENOUS at 08:04

## 2017-04-03 RX ADMIN — PROPOFOL 50 MG/KG/MIN: 10 INJECTION, EMULSION INTRAVENOUS at 12:04

## 2017-04-03 RX ADMIN — ESMOLOL HYDROCHLORIDE 30 MG: 10 INJECTION INTRAVENOUS at 07:04

## 2017-04-03 RX ADMIN — ESMOLOL HYDROCHLORIDE 60 MG: 10 INJECTION INTRAVENOUS at 07:04

## 2017-04-03 RX ADMIN — AMINOCAPROIC ACID 10 G: 250 INJECTION, SOLUTION INTRAVENOUS at 07:04

## 2017-04-03 RX ADMIN — FENTANYL CITRATE 100 MCG: 50 INJECTION, SOLUTION INTRAMUSCULAR; INTRAVENOUS at 03:04

## 2017-04-03 RX ADMIN — DEXTROSE 2 G: 50 INJECTION, SOLUTION INTRAVENOUS at 08:04

## 2017-04-03 RX ADMIN — SODIUM CHLORIDE: 0.9 INJECTION, SOLUTION INTRAVENOUS at 06:04

## 2017-04-03 RX ADMIN — NICARDIPINE HYDROCHLORIDE 5 MG/HR: 0.2 INJECTION, SOLUTION INTRAVENOUS at 01:04

## 2017-04-03 RX ADMIN — FENTANYL CITRATE 50 MCG: 50 INJECTION, SOLUTION INTRAMUSCULAR; INTRAVENOUS at 07:04

## 2017-04-03 RX ADMIN — MUPIROCIN 1 G: 20 OINTMENT TOPICAL at 09:04

## 2017-04-03 RX ADMIN — FENTANYL CITRATE 100 MCG: 50 INJECTION INTRAMUSCULAR; INTRAVENOUS at 03:04

## 2017-04-03 RX ADMIN — SODIUM CHLORIDE, SODIUM GLUCONATE, SODIUM ACETATE, POTASSIUM CHLORIDE, MAGNESIUM CHLORIDE, SODIUM PHOSPHATE, DIBASIC, AND POTASSIUM PHOSPHATE: .53; .5; .37; .037; .03; .012; .00082 INJECTION, SOLUTION INTRAVENOUS at 07:04

## 2017-04-03 NOTE — PLAN OF CARE
Problem: Patient Care Overview  Goal: Plan of Care Review  Outcome: Ongoing (interventions implemented as appropriate)     Nitro given x 1 for CP. Plan for CABG today. No acute events throughout the night. Reviewed plan of care with pt and family; all questions/concerns addressed. VS and assessment per flow sheet, patient progressing towards goals as tolerated. Will continue to monitor.

## 2017-04-03 NOTE — NURSING TRANSFER
Nursing Transfer Note      4/3/2017     Transfer To: surgery    Transfer via wheelchair    Transfer with n/a    Transported by RN    Medicines sent: n/a    Chart send with patient: Yes    Notified: family

## 2017-04-03 NOTE — PLAN OF CARE
Problem: Pain, Acute (Adult)  Goal: Acceptable Pain Control/Comfort Level  Patient will demonstrate the desired outcomes by discharge/transition of care.   Outcome: Ongoing (interventions implemented as appropriate)  Making progress towards post op pain management with medication timing.

## 2017-04-03 NOTE — PLAN OF CARE
Problem: Patient Care Overview  Goal: Plan of Care Review  Outcome: Ongoing (interventions implemented as appropriate)  Pt extubated. Weaned off of epi, levo, and  Cardene. Passed bedside swallow test. Pt complained of intense pain to midline incision. Given PRN pain meds for pain management. Plan of care reviewed with pt and family

## 2017-04-03 NOTE — PROGRESS NOTES
Patient admitted to unit intubated with a size 9.0 oral ETT secured at 22cm at the lip.  Patient placed on ventilator per MD orders.  Patient tolerating well.  Will continue to monitor.

## 2017-04-03 NOTE — PLAN OF CARE
Problem: Pain, Acute (Adult)  Goal: Identify Related Risk Factors and Signs and Symptoms  Related risk factors and signs and symptoms are identified upon initiation of Human Response Clinical Practice Guideline (CPG)   Outcome: Ongoing (interventions implemented as appropriate)  Pt verbalized pain to midline incision. Pain managed with medication timing.

## 2017-04-03 NOTE — PROGRESS NOTES
Pt arrived to 6077 from OR. Anesthesia at bedside. Pt connected to monitor. Head to toe assessment performed. See flowsheet @ 1300 for full assessment. Will continue to monitor     Skin note: Intact; no break downs noted

## 2017-04-03 NOTE — ANESTHESIA PROCEDURE NOTES
Arterial    Diagnosis: CAD    Patient location during procedure: done in OR  Procedure start time: 4/3/2017 7:02 AM  Timeout: 4/3/2017 7:01 AM  Procedure end time: 4/3/2017 7:05 AM  Staffing  Anesthesiologist: LINA KULKARNI JR  Resident/CRNA: MOUSTAPHA SANDOVAL  Performed by: resident/CRNA   Anesthesiologist was present at the time of the procedure.  Preanesthetic Checklist  Completed: patient identified, site marked, surgical consent, pre-op evaluation, timeout performed, IV checked, risks and benefits discussed, monitors and equipment checked and anesthesia consent given  Arterial Line  Skin Prep: chlorhexidine gluconate  Local Infiltration: lidocaine  Orientation: left  Location: radial  Catheter Size: 20 G{[facility] ANESTHESIA BLOCK ART PLACEMENTInsertion Attempts: 1  Assessment  Dressing: secured with tape and tegaderm  Patient: Tolerated well

## 2017-04-03 NOTE — H&P
History & Physical  Surgical Intensive Care    SUBJECTIVE:     Chief Complaint/Reason for Admission: NSTEMI    History of Present Illness:  Patient is a 46 y.o. male with PMHx of HTN, HLD, gout, nephrolithiasis, chronic tobacco abuse, and CAD who was admitted to OSH with NSTEMI. 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 He presented to [facility] and workup revealed NSTEMI. His chest pain on admission was relieved by NTG and EKG showed no significant ST segment changes. Troponin was elevated to a peak of 0.148. He denied shortness of breath, PND, diaphoresis, fever, chills. He admits to history of kidney stones with associated back pain. He is a current 1/2 ppd smoker and has smoked as such for roughly 20 years.     Cardiac catheterization demonstrated triple vessel dx. He was transferred to [facility] for workup for CABG. He is now POD 0 from CABG. Pt successfully extubated at bedside. Transferred on nicardipine gtt which is now discontinued.     PTA Medications   Medication Sig    rosuvastatin (CRESTOR) 40 MG Tab Take 40 mg by mouth once daily.     Drips:     dextrose 5 % and 0.45 % NaCl with KCl 20 mEq      epinephrine      insulin (HUMAN R) infusion (adults)      nicardipine 5 mg/hr (04/03/17 1321)    norepinephrine bitartrate-D5W      propofol Stopped (04/03/17 1312)       Review of patient's allergies indicates:   Allergen Reactions    Morpholine analogues Other (See Comments)       Past Medical History:   Diagnosis Date    Family history of ischemic heart disease 3/29/2017    Mother received coronary stent at age 38.    Gout     Hyperuricemia 3/29/2017    2016: Diagnosed.    Kidney stones     Nephrolithiasis 3/29/2017    2016: Diagnosed.    Tobacco use 3/29/2017    1990: Began smoking. Unable to quit.     Past Surgical History:   Procedure Laterality Date    LITHOTRIPSY       History reviewed. No pertinent family history.  Social  History   Substance Use Topics    Smoking status: Current Every Day Smoker     Packs/day: 0.25     Start date: 1/1/1990    Smokeless tobacco: Never Used    Alcohol use Yes        Review of Systems:  Constitutional: no fever or chills  Eyes: no visual changes  ENT: no nasal congestion or sore throat  Respiratory: no cough or shortness of breath  Cardiovascular: no chest pain or palpitations  Gastrointestinal: no nausea or vomiting, no abdominal pain or change in bowel habits  Genitourinary: no hematuria or dysuria  Neurological: no seizures or tremors  Behavioral/Psych: no auditory or visual hallucinations    OBJECTIVE:     Vital Signs (Most Recent)  Temp: (!) 94.6 °F (34.8 °C) (04/03/17 1237)  Pulse: 75 (04/03/17 1300)  Resp: (!) 21 (04/03/17 1300)  BP: 120/72 (04/03/17 0632)  SpO2: 100 % (04/03/17 1300)        Physical Exam:  General: well developed, no distress  HENT: Head:normocephalic, atraumatic. Ears:bilateral TM's and external ear canals normal. Nose: Nares normal. Septum midline. Mucosa normal. No drainage or sinus tenderness., no discharge. Throat: lips, mucosa, and tongue normal; teeth and gums normal and no throat erythema.  Eyes: conjunctivae/corneas clear. PERRL.   Neck: supple, symmetrical, trachea midline, no JVD and thyroid not enlarged, symmetric, no tenderness/mass/nodules  Lungs:  clear to auscultation bilaterally and normal respiratory effort  Cardiovascular: Heart: regular rate and rhythm, S1, S2 normal, no murmur, click, rub or gallop. Chest Wall: no tenderness. Extremities: no cyanosis or edema, or clubbing. Pulses: 2+ and symmetric.  Abdomen/Rectal: Abdomen: soft, non-tender non-distented; bowel sounds normal; no masses,  no organomegaly.  Neurologic: Normal strength and tone. No focal numbness or weakness  Psych/Behavioral:  Normal. and Alert and oriented, appropriate affect.    Lines/Drains:       Peripheral IV - Single Lumen 03/28/17 2038 Left Antecubital (Active)   Site Assessment  Clean;Intact;Dry;No swelling;No redness 4/2/2017  8:00 PM   Line Status Flushed;Saline locked 4/2/2017  8:00 PM   Dressing Status Clean;Dry;Intact 4/2/2017  8:00 PM   Dressing Intervention Dressing reinforced 3/30/2017  9:00 PM   Dressing Change Due 04/01/17 4/2/2017  8:00 PM   Site Change Due 04/01/17 3/31/2017  7:15 PM   Reason Not Rotated Not due 4/2/2017  8:00 PM   Number of days:5            Peripheral IV - Single Lumen 04/03/17 0725 Right Forearm (Active)   Number of days:0            Arterial Line 04/03/17 0702 Left Radial (Active)   Number of days:0            Pacer Wires 04/03/17 1130 (Active)   Number of days:0            NG/OG Tube 04/03/17 1300 Right mouth (Active)   Number of days:0            Urethral Catheter 04/03/17 0731 Temperature probe;Straight-tip;Non-latex 16 Fr. (Active)   Output (mL) 125 mL 4/3/2017  1:00 PM   Number of days:0            Y Chest Tube 1 and 2 04/03/17 1126 Right Mediastinal 19 Fr. Left Pleural 19 Fr. (Active)   Output (mL) 30 mL 4/3/2017  1:00 PM   Number of days:0            Y Chest Tube 3 and 4 04/03/17 1125 Right Pleural 19 Fr. Left Pleural 19 Fr. (Active)   Output (mL) 55 mL 4/3/2017  1:00 PM   Number of days:0       Laboratory  CBC:     Recent Labs  Lab 04/03/17  1243 04/03/17  1247   WBC 10.19  --    RBC 4.04*  --    HGB 12.6*  --    HCT 35.0* 33*   *  --    MCV 87  --    MCH 31.2*  --    MCHC 36.0  --      BMP:   Recent Labs  Lab 04/03/17  1243   GLU 95      K 3.9  3.9      CO2 25   BUN 15   CREATININE 0.8   CALCIUM 7.3*   MG 2.3     CMP:   Recent Labs  Lab 03/30/17  0600  04/03/17  1243   GLU 67*  < > 95   CALCIUM 8.6*  < > 7.3*   ALBUMIN 3.6  --   --    PROT 6.5  --   --      < > 141   K 3.8  < > 3.9  3.9   CO2 22*  < > 25     < > 109   BUN 19  < > 15   CREATININE 0.9  < > 0.8   ALKPHOS 66  --   --    ALT 17  --   --    AST 13  --   --    BILITOT 0.6  --   --    < > = values in this interval not displayed.  LFTs:   Recent Labs  Lab  03/30/17  0600   ALT 17   AST 13   ALKPHOS 66   BILITOT 0.6   PROT 6.5   ALBUMIN 3.6     Coagulation:   Recent Labs  Lab 04/03/17  1243   INR 1.0   APTT 26.4     Cardiac markers:   Recent Labs  Lab 03/30/17  2101   TROPONINI 0.053*     ABGs:   Recent Labs  Lab 04/03/17  1247   PH 7.319*   PCO2 48.2*   PO2 172*   HCO3 24.8   POCSATURATED 99   BE -1     CXR (4/3/17): Satisfactory postoperative chest radiograph with sternal sutures well aligned.     Diagnostic Results:    EKG (3/31)   Normal sinus rhythm  Possible Left atrial enlargement  Borderline Abnormal ECG  When compared with ECG of 28-MAR-2017 18:08,  No significant change was found    Heart Cath (3/29)     Diagnostic:          Patient has a right dominant coronary artery.        - Left Main Coronary Artery:             The LM is normal. There is FAYE 3 flow.     - Left Anterior Descending Artery:             The proximal LAD has a 90% stenosis. There is FAYE 2 flow.             The mid LAD has chronic total occlusion. There is FAYE 0 flow. LAD wrap around. Fills late from RCA.     - Left Circumflex Artery:             The LCX has luminal irregularities. There is FAYE 3 flow.     - OM1:             The proximal OM1 has a 90% stenosis. There is FAYE 3 flow. Large branching vessel.     - Right Coronary Artery:             The distal RCA has a 80% stenosis. There is FAYE 3 flow. The remaining portion of the vessel has luminal irregularities.                     Lesion Details:   The length is 40mm.      Echo (3/29)  CONCLUSIONS     1 - Concentric remodeling.     2 - Low normal to mildly depressed left ventricular systolic function (EF 50-55%).     3 - Anteroseptal/apical WMA.     ASSESSMENT/PLAN:     Patient is a 46 y.o. male with PMHx of HTN, HLD, gout, nephrolithiasis, chronic tobacco abuse, and CAD who was admitted to OSH with workup revealing NSTEMI and heart cath showing 3 vessel dx. He was transferred to [facility] for [place]up for CABG. Pt is now POD 0 s/p CABG x3.      Plan:  Neuro:   AAOx3, GCS 15  Propofol gtt discontinued     Pulmonary:   -successful extubation at bedside  -CXR as above, satisfactory post-operative changes  -cont nasal cannula PRN  -ABG PRN    Cardiac:  -NSTEMI with heart cath showing 3 vessel dx.   -s/p CABG x3 today.   -f/u repeat EKG  -Cardene gtt d/cd.  -wean drips as tolerated.     Renal:   -alicea in place  -monitor I/Os.     Infectious Disease:   - cont post op Ancef q8 hrs x3 doses  - monitor CBC    Hematology/Oncology:  - monitor CBC  - preop H/H at 14.2/41.7, currently at 12.6/35.0    Endocrine  - insulin gtt d/cd.  - monitor POCT glucose    Fluids/Electrolytes/Nutrition/GI:   -D5 1/2NS with KCl gtt  -monitor BMP, Mg, Phos qday.  -Replace lytes PRN    Pt NPO.   F/u Dietary consult.     Pain Management:   - pain currently 8/10  - Morphine PRN    Dispo: cont ICU care. Stepdown per primary team.       Norbert Cruz MD  [facility]

## 2017-04-03 NOTE — ANESTHESIA PROCEDURE NOTES
Final KASSIDY    Diagnosis: CAD  Procedure start time: 4/3/2017 11:23 AM  Timeout: 4/3/2017 11:23 AM  Procedure end time: 4/3/2017 11:33 AM  Exam type: Final  Staffing  Anesthesiologist: LINA KULKARNI JR  Resident/CRNA: ARYA SON  Performed by: resident/CRNA and anesthesiologist   Preanesthetic Checklist  Completed: patient identified, surgical consent, pre-op evaluation, timeout performed, risks and benefits discussed, monitors and equipment checked, anesthesia consent given, oxygen available, suction available, hand hygiene performed and patient being monitored  Setup & Induction  Patient preparation: bite block inserted  Probe Insertion: easy  Exam: complete    Exam  Left Atrium: normal   Estimated Ejection Fraction: > 55% normal  Regional Wall Abnormalities: no RWMA        Right Heart  Right Ventricle dilated: no  Right Ventricle Function: normal      Aortic Valve:  Prosthesis: none  Perivalvular leak: not applicable  Regurgitation(color flow): 0-tr     Mitral Valve:  Prosthesis: none  Perivalvlular Leak: not applicable  Regurgitation(color flow): 0-tr     Tricuspid Valve:  Prosthesis: none  Pervalvular Leak: not applicable  Regurgitation: 0-tr    Pulmonic Valve:  Prosthesis: none  Regurgitation(color flow): 0-tr  Perivalvular Leak: not applicable      Aorta  Descending Aorta dissection: no  Descending aorta IABP: no  Aortic Arch Dissection: no  Ascending Aorta Dissection: no    LVAD:no

## 2017-04-03 NOTE — BRIEF OP NOTE
[facility]  Cardiothoracic Surgery  Operative Note    SUMMARY     Date of Procedure: 4/3/2017     Procedure: Procedure(s) (LRB):  AORTOCORONARY BYPASS-CABG/CABGX3 (N/A)    Surgeon(s) and Role:     * Enrrique Allen MD - Primary    Assisting Surgeon: None    Pre-Operative Diagnosis: Coronary artery disease involving native coronary artery of native heart, angina presence unspecified [I25.10]    Post-Operative Diagnosis: Post-Op Diagnosis Codes:     * Coronary artery disease involving native coronary artery of native heart, angina presence unspecified [I25.10]    Anesthesia: General    Technical Procedures Used: mita to om lima to lad, xclamp 31 min    Description of the Findings of the Procedure: pda not a target      Complications: No    Estimated Blood Loss (EBL): 100 cc           Implants: * No implants in log *    Specimens:   Specimen     None                  Condition: Good    Disposition: ICU - intubated and hemodynamically stable.    Attestation: I performed the procedure.

## 2017-04-03 NOTE — ANESTHESIA PROCEDURE NOTES
Central Line    Diagnosis: CAD  Patient location during procedure: done in OR  Procedure start time: 4/3/2017 7:35 AM  Timeout: 4/3/2017 7:34 AM  Procedure end time: 4/3/2017 7:47 AM  Staffing  Anesthesiologist: LINA KULKARNI JR  Resident/CRNA: MOUSTAPHA SANDOVAL  Performed by: resident/CRNA   Anesthesiologist was present at the time of the procedure.  Preanesthetic Checklist  Completed: patient identified, site marked, surgical consent, pre-op evaluation, timeout performed, IV checked, risks and benefits discussed, monitors and equipment checked and anesthesia consent given  Indication  Indication: hemodynamic monitoring, vascular access, med administration     Anesthesia   general anesthesia    Central Line  Skin Prep: skin prepped with ChloraPrep, skin prep agent completely dried prior to procedure  maximum sterile barriers used during central venous catheter insertion  hand hygiene performed prior to central venous catheter insertion  Location: right internal jugular,   Catheter Type: quad lumen.  Catheter Size: 8.5 Fr  Ultrasound: vascular probe with ultrasound  Vessel Caliber: large, patent  Vascular Doppler:  not done, compressibility normal  Needle advanced into vessel with real time Ultrasound guidance.  Guidewire confirmed in vessel.  Sterile sheath used.  Image recorded and saved.   Manometry: Venous cannualation confirmed by visual estimation of blood vessel pressure using manometry.  Insertion Attempts: 1   Securement:line sutured, chlorhexidine patch, sterile dressing applied and blood return through all ports     Post-Procedure  Adverse Events:none

## 2017-04-03 NOTE — PLAN OF CARE
Problem: Fall Risk (Adult)  Goal: Identify Related Risk Factors and Signs and Symptoms  Related risk factors and signs and symptoms are identified upon initiation of Human Response Clinical Practice Guideline (CPG)   Outcome: Ongoing (interventions implemented as appropriate)  Narcotic medications put pt at risk for falls. Will cont to provide preventative measures

## 2017-04-03 NOTE — TRANSFER OF CARE
"Anesthesia Transfer of Care Note    Patient: Se Denson    Procedure(s) Performed: Procedure(s) (LRB):  AORTOCORONARY BYPASS-CABG/CABGX3 (N/A)    Patient location: ICU    Anesthesia Type: general    Transport from OR: Upon arrival to PACU/ICU, patient attached to ventilator and auscultated to confirm bilateral breath sounds and adequate TV. Continuous ECG monitoring in transport. Continuous SpO2 monitoring in transport. Continuos invasive BP monitoring in transport. Continuous CVP monitoring in transport. Transported from OR intubated on 100% O2 by AMBU with adequate controlled ventilation    Post pain: adequate analgesia    Post assessment: no apparent anesthetic complications    Post vital signs: stable    Level of consciousness: sedated    Nausea/Vomiting: no nausea/vomiting    Complications: none          Last vitals:   Visit Vitals    /72 (BP Location: Right arm, Patient Position: Lying, BP Method: Automatic)    Pulse 68    Temp 36.6 °C (97.8 °F) (Oral)    Resp 18    Ht 6' 2" (1.88 m)    Wt 103.2 kg (227 lb 8.2 oz)    SpO2 99%    BMI 29.21 kg/m2     "

## 2017-04-03 NOTE — ANESTHESIA PROCEDURE NOTES
KASSIDY    Diagnosis: CAD  Patient location during procedure: OR  Procedure start time: 4/3/2017 8:16 AM  Timeout: 4/3/2017 8:16 AM  Procedure end time: 4/3/2017 8:16 AM  Exam type: Baseline  Staffing  Anesthesiologist: LINA KULKARNI JR  Resident/CRNA: ARYA SON  Performed by: resident/CRNA   Preanesthetic Checklist  Completed: patient identified, surgical consent, pre-op evaluation, timeout performed, risks and benefits discussed, monitors and equipment checked, anesthesia consent given, oxygen available, suction available, hand hygiene performed and patient being monitored  Setup & Induction  Patient preparation: bite block inserted  Probe Insertion: easy  Exam: complete  Exam         LVAD:no  Regional Wall Abnormalities: no RWMA            Right Heart  Right Ventricle: normal  Right Ventricle Function: normal    Intra Atrial Septum  PFO: no shunt by color flow doppler  no IAS aneurysm  no lipomatous hypertrophy  no Atrial Septal Defect (Asd)    Right Ventricle  Size: normal, Free Wall Thickness: normal    Aortic Valve:  Stenosis: none  Morphology: trileaflet  Regurgitation: no aortic valve regurgitation     Mitral Valve:  Morphology:normal  Prolapse: none  Flail: no flail  Jet Description: trace and none    Tricuspid Valve:  Morphology: normal  Regurgitation: none    Pulmonic Valve:  Morphology:normal  Regurgitation(color flow): none    Great Vessels  Ascending Aorta Atherosclerosis: 1=nl-min dz  IABP: no  Descending Aorta Atherosclerosis: 2=mild dz (<3mm)  Aorta    Descending aorta IABP: no    Effusions  Effusions: none    Summary  Findings discussed with surgeon.    Other Findings

## 2017-04-04 PROBLEM — Z95.1 S/P CABG (CORONARY ARTERY BYPASS GRAFT): Status: ACTIVE | Noted: 2017-04-04

## 2017-04-04 LAB
ANION GAP SERPL CALC-SCNC: 8 MMOL/L
APTT BLDCRRT: 25.2 SEC
BASOPHILS # BLD AUTO: 0.01 K/UL
BASOPHILS NFR BLD: 0.1 %
BUN SERPL-MCNC: 11 MG/DL
CALCIUM SERPL-MCNC: 8 MG/DL
CHLORIDE SERPL-SCNC: 106 MMOL/L
CO2 SERPL-SCNC: 24 MMOL/L
CREAT SERPL-MCNC: 0.7 MG/DL
DIFFERENTIAL METHOD: ABNORMAL
EOSINOPHIL # BLD AUTO: 0.1 K/UL
EOSINOPHIL NFR BLD: 0.7 %
ERYTHROCYTE [DISTWIDTH] IN BLOOD BY AUTOMATED COUNT: 12.1 %
EST. GFR  (AFRICAN AMERICAN): >60 ML/MIN/1.73 M^2
EST. GFR  (NON AFRICAN AMERICAN): >60 ML/MIN/1.73 M^2
GLUCOSE SERPL-MCNC: 98 MG/DL
HCT VFR BLD AUTO: 35.5 %
HCT VFR BLD AUTO: 35.5 %
HGB BLD-MCNC: 12.6 G/DL
HGB BLD-MCNC: 12.6 G/DL
INR PPP: 1
LYMPHOCYTES # BLD AUTO: 1.4 K/UL
LYMPHOCYTES NFR BLD: 18.2 %
MAGNESIUM SERPL-MCNC: 1.9 MG/DL
MCH RBC QN AUTO: 30.7 PG
MCHC RBC AUTO-ENTMCNC: 35.5 %
MCV RBC AUTO: 87 FL
MONOCYTES # BLD AUTO: 1 K/UL
MONOCYTES NFR BLD: 13.5 %
NEUTROPHILS # BLD AUTO: 5.1 K/UL
NEUTROPHILS NFR BLD: 67.4 %
PHOSPHATE SERPL-MCNC: 3.9 MG/DL
PLATELET # BLD AUTO: 120 K/UL
PMV BLD AUTO: 10.3 FL
POCT GLUCOSE: 104 MG/DL (ref 70–110)
POCT GLUCOSE: 106 MG/DL (ref 70–110)
POCT GLUCOSE: 107 MG/DL (ref 70–110)
POCT GLUCOSE: 109 MG/DL (ref 70–110)
POCT GLUCOSE: 110 MG/DL (ref 70–110)
POCT GLUCOSE: 76 MG/DL (ref 70–110)
POCT GLUCOSE: 83 MG/DL (ref 70–110)
POCT GLUCOSE: 84 MG/DL (ref 70–110)
POCT GLUCOSE: 84 MG/DL (ref 70–110)
POCT GLUCOSE: 88 MG/DL (ref 70–110)
POCT GLUCOSE: 91 MG/DL (ref 70–110)
POCT GLUCOSE: 91 MG/DL (ref 70–110)
POCT GLUCOSE: 93 MG/DL (ref 70–110)
POCT GLUCOSE: 94 MG/DL (ref 70–110)
POCT GLUCOSE: 95 MG/DL (ref 70–110)
POTASSIUM SERPL-SCNC: 4.2 MMOL/L
POTASSIUM SERPL-SCNC: 4.2 MMOL/L
PROTHROMBIN TIME: 10.6 SEC
RBC # BLD AUTO: 4.1 M/UL
SODIUM SERPL-SCNC: 138 MMOL/L
WBC # BLD AUTO: 7.53 K/UL

## 2017-04-04 PROCEDURE — 25000003 PHARM REV CODE 250: Performed by: SURGERY

## 2017-04-04 PROCEDURE — P9045 ALBUMIN (HUMAN), 5%, 250 ML: HCPCS | Performed by: INTERNAL MEDICINE

## 2017-04-04 PROCEDURE — 25000003 PHARM REV CODE 250: Performed by: GENERAL PRACTICE

## 2017-04-04 PROCEDURE — 99900035 HC TECH TIME PER 15 MIN (STAT)

## 2017-04-04 PROCEDURE — 63600175 PHARM REV CODE 636 W HCPCS: Performed by: GENERAL PRACTICE

## 2017-04-04 PROCEDURE — 80048 BASIC METABOLIC PNL TOTAL CA: CPT

## 2017-04-04 PROCEDURE — 84100 ASSAY OF PHOSPHORUS: CPT

## 2017-04-04 PROCEDURE — 85610 PROTHROMBIN TIME: CPT

## 2017-04-04 PROCEDURE — 85730 THROMBOPLASTIN TIME PARTIAL: CPT

## 2017-04-04 PROCEDURE — 97802 MEDICAL NUTRITION INDIV IN: CPT

## 2017-04-04 PROCEDURE — 20600001 HC STEP DOWN PRIVATE ROOM

## 2017-04-04 PROCEDURE — 85025 COMPLETE CBC W/AUTO DIFF WBC: CPT

## 2017-04-04 PROCEDURE — 83735 ASSAY OF MAGNESIUM: CPT

## 2017-04-04 PROCEDURE — 97162 PT EVAL MOD COMPLEX 30 MIN: CPT

## 2017-04-04 PROCEDURE — P9045 ALBUMIN (HUMAN), 5%, 250 ML: HCPCS | Performed by: GENERAL PRACTICE

## 2017-04-04 PROCEDURE — 25000003 PHARM REV CODE 250: Performed by: INTERNAL MEDICINE

## 2017-04-04 PROCEDURE — 63600175 PHARM REV CODE 636 W HCPCS: Performed by: NURSE PRACTITIONER

## 2017-04-04 PROCEDURE — 63600175 PHARM REV CODE 636 W HCPCS: Performed by: INTERNAL MEDICINE

## 2017-04-04 RX ORDER — ACETAMINOPHEN 325 MG/1
650 TABLET ORAL EVERY 4 HOURS PRN
Status: DISCONTINUED | OUTPATIENT
Start: 2017-04-04 | End: 2017-04-07

## 2017-04-04 RX ORDER — HYDROMORPHONE HYDROCHLORIDE 1 MG/ML
0.5 INJECTION, SOLUTION INTRAMUSCULAR; INTRAVENOUS; SUBCUTANEOUS EVERY 4 HOURS PRN
Status: DISCONTINUED | OUTPATIENT
Start: 2017-04-04 | End: 2017-04-05

## 2017-04-04 RX ORDER — METOPROLOL TARTRATE 25 MG/1
25 TABLET, FILM COATED ORAL 3 TIMES DAILY
Status: DISCONTINUED | OUTPATIENT
Start: 2017-04-04 | End: 2017-04-05

## 2017-04-04 RX ORDER — RAMELTEON 8 MG/1
8 TABLET ORAL NIGHTLY PRN
Status: DISCONTINUED | OUTPATIENT
Start: 2017-04-04 | End: 2017-04-07

## 2017-04-04 RX ORDER — ASPIRIN 325 MG
325 TABLET, DELAYED RELEASE (ENTERIC COATED) ORAL DAILY
Status: DISCONTINUED | OUTPATIENT
Start: 2017-04-04 | End: 2017-04-07 | Stop reason: HOSPADM

## 2017-04-04 RX ORDER — ALBUMIN HUMAN 50 G/1000ML
12.5 SOLUTION INTRAVENOUS ONCE
Status: COMPLETED | OUTPATIENT
Start: 2017-04-04 | End: 2017-04-04

## 2017-04-04 RX ORDER — OXYCODONE HYDROCHLORIDE 5 MG/1
10 TABLET ORAL EVERY 4 HOURS PRN
Status: DISCONTINUED | OUTPATIENT
Start: 2017-04-04 | End: 2017-04-07 | Stop reason: HOSPADM

## 2017-04-04 RX ORDER — OXYCODONE HYDROCHLORIDE 5 MG/1
15 TABLET ORAL EVERY 4 HOURS PRN
Status: DISCONTINUED | OUTPATIENT
Start: 2017-04-04 | End: 2017-04-07

## 2017-04-04 RX ORDER — HYDROMORPHONE HYDROCHLORIDE 1 MG/ML
0.5 INJECTION, SOLUTION INTRAMUSCULAR; INTRAVENOUS; SUBCUTANEOUS EVERY 4 HOURS PRN
Status: DISCONTINUED | OUTPATIENT
Start: 2017-04-04 | End: 2017-04-04

## 2017-04-04 RX ORDER — ALBUMIN HUMAN 50 G/1000ML
25 SOLUTION INTRAVENOUS ONCE
Status: COMPLETED | OUTPATIENT
Start: 2017-04-04 | End: 2017-04-04

## 2017-04-04 RX ADMIN — ALBUMIN (HUMAN) 12.5 G: 12.5 SOLUTION INTRAVENOUS at 09:04

## 2017-04-04 RX ADMIN — ACETAMINOPHEN 1000 MG: 10 INJECTION, SOLUTION INTRAVENOUS at 10:04

## 2017-04-04 RX ADMIN — OXYCODONE HYDROCHLORIDE 15 MG: 5 TABLET ORAL at 07:04

## 2017-04-04 RX ADMIN — ALLOPURINOL 100 MG: 100 TABLET ORAL at 08:04

## 2017-04-04 RX ADMIN — DEXTROSE MONOHYDRATE 12.5 G: 25 INJECTION, SOLUTION INTRAVENOUS at 05:04

## 2017-04-04 RX ADMIN — CEFAZOLIN SODIUM 2 G: 2 SOLUTION INTRAVENOUS at 12:04

## 2017-04-04 RX ADMIN — HYDROMORPHONE HYDROCHLORIDE 1 MG: 1 INJECTION, SOLUTION INTRAMUSCULAR; INTRAVENOUS; SUBCUTANEOUS at 01:04

## 2017-04-04 RX ADMIN — OXYCODONE HYDROCHLORIDE 10 MG: 5 TABLET ORAL at 04:04

## 2017-04-04 RX ADMIN — OXYCODONE HYDROCHLORIDE 15 MG: 5 TABLET ORAL at 01:04

## 2017-04-04 RX ADMIN — HYDROMORPHONE HYDROCHLORIDE 0.5 MG: 1 INJECTION, SOLUTION INTRAMUSCULAR; INTRAVENOUS; SUBCUTANEOUS at 09:04

## 2017-04-04 RX ADMIN — RAMELTEON 8 MG: 8 TABLET, FILM COATED ORAL at 10:04

## 2017-04-04 RX ADMIN — EZETIMIBE 10 MG: 10 TABLET ORAL at 08:04

## 2017-04-04 RX ADMIN — MAGNESIUM SULFATE IN WATER 2 G: 40 INJECTION, SOLUTION INTRAVENOUS at 06:04

## 2017-04-04 RX ADMIN — METOPROLOL TARTRATE 25 MG: 25 TABLET ORAL at 10:04

## 2017-04-04 RX ADMIN — METOPROLOL TARTRATE 25 MG: 25 TABLET ORAL at 01:04

## 2017-04-04 RX ADMIN — CEFAZOLIN SODIUM 2 G: 2 SOLUTION INTRAVENOUS at 08:04

## 2017-04-04 RX ADMIN — MUPIROCIN 1 G: 20 OINTMENT TOPICAL at 11:04

## 2017-04-04 RX ADMIN — ROSUVASTATIN CALCIUM 40 MG: 20 TABLET, FILM COATED ORAL at 08:04

## 2017-04-04 RX ADMIN — OXYCODONE HYDROCHLORIDE 15 MG: 5 TABLET ORAL at 11:04

## 2017-04-04 RX ADMIN — HYDROMORPHONE HYDROCHLORIDE 1 MG: 1 INJECTION, SOLUTION INTRAMUSCULAR; INTRAVENOUS; SUBCUTANEOUS at 05:04

## 2017-04-04 RX ADMIN — ASPIRIN 325 MG: 325 TABLET, DELAYED RELEASE ORAL at 08:04

## 2017-04-04 RX ADMIN — Medication 3 ML: at 06:04

## 2017-04-04 RX ADMIN — ALBUMIN (HUMAN) 25 G: 12.5 SOLUTION INTRAVENOUS at 02:04

## 2017-04-04 RX ADMIN — OXYCODONE HYDROCHLORIDE 15 MG: 5 TABLET ORAL at 08:04

## 2017-04-04 RX ADMIN — MUPIROCIN 1 G: 20 OINTMENT TOPICAL at 08:04

## 2017-04-04 RX ADMIN — Medication 3 ML: at 01:04

## 2017-04-04 RX ADMIN — HYDROMORPHONE HYDROCHLORIDE 1 MG: 1 INJECTION, SOLUTION INTRAMUSCULAR; INTRAVENOUS; SUBCUTANEOUS at 10:04

## 2017-04-04 RX ADMIN — METOPROLOL TARTRATE 25 MG: 25 TABLET ORAL at 08:04

## 2017-04-04 RX ADMIN — ACETAMINOPHEN 1000 MG: 10 INJECTION, SOLUTION INTRAVENOUS at 02:04

## 2017-04-04 RX ADMIN — HYDROMORPHONE HYDROCHLORIDE 0.5 MG: 1 INJECTION, SOLUTION INTRAMUSCULAR; INTRAVENOUS; SUBCUTANEOUS at 05:04

## 2017-04-04 NOTE — NURSING
Dr. Allen at pt's bedside. Updated on pt's vitals. Orders to stop albumin infusion. 100ccs given already. Will continue to monitor.

## 2017-04-04 NOTE — PLAN OF CARE
Problem: Patient Care Overview  Goal: Plan of Care Review  Outcome: Ongoing (interventions implemented as appropriate)  3/30- CABG, extubated & weaned off pressors and Cardene     Plan of Care:  MAP 60-80  Pain control  Advance diet as tolerated  Q1 Hr accuchecks  Stepdown

## 2017-04-04 NOTE — NURSING TRANSFER
Nursing Transfer Note      4/4/2017     Transfer To:306    Transfer via stretcher    Transfer with cardiac monitoring    Transported by RN    Medicines sent: No    Chart send with patient: Yes    Notified: Parents     Patient reassessed at:11:00    Upon arrival to floor: cardiac monitor applied, patient oriented to room, call bell in reach and bed in lowest position

## 2017-04-04 NOTE — NURSING
Pt's CVP 4 and urine output decreased. All other VSS. Giving 500 mL albumin per Dr. Cheatham's orders. Will continue to monitor.

## 2017-04-04 NOTE — PROGRESS NOTES
Progress Note  Surgical Intensive Care    Admit Date: 3/28/2017  Post-operative Day: 1 Day Post-Op  Hospital Day: 8    SUBJECTIVE:     Follow-up For:  Procedure(s) (LRB):  AORTOCORONARY BYPASS-CABG/CABGX3 (N/A)    HPI:  Patient is a 46 y.o. male with PMHx of HTN, HLD, gout, nephrolithiasis, chronic tobacco abuse, and CAD who was admitted to OS with NSTEMI. 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 He presented to [facility] and workup revealed NSTEMI. His chest pain on admission was relieved by NTG and EKG showed no significant ST segment changes. Troponin was elevated to a peak of 0.148. He denied shortness of breath, PND, diaphoresis, fever, chills. He admits to history of kidney stones with associated back pain. He is a current 1/2 ppd smoker and has smoked as such for roughly 20 years.      Cardiac catheterization demonstrated triple vessel dx. He was transferred to [facility] for workup for CABG. He is now POD 1 from CABG. Pt successfully extubated at bedside. Transferred on nicardipine gtt which is now discontinued.     Interval history: NAEON. Pt endorses pain controlled. HDS. Sats at 98% on NC. OOB to chair. No other acute issues.     Continuous Infusions:   dextrose 5 % and 0.45 % NaCl with KCl 20 mEq 25 mL/hr at 04/04/17 0600    epinephrine 0.04 mcg/kg/min (04/03/17 1509)    insulin (HUMAN R) infusion (adults) Stopped (04/03/17 2000)    nicardipine Stopped (04/03/17 1355)    norepinephrine bitartrate-D5W       Scheduled Meds:   acetaminophen  1,000 mg Intravenous Q8H    allopurinol  100 mg Oral Daily    aspirin  325 mg Oral Daily    ceFAZolin 2 g/50mL Dextrose IVPB  2 g Intravenous Q8H    ezetimibe  10 mg Oral Daily    HYDROmorphone  0.5 mg Intravenous Once    mupirocin  1 g Nasal BID    rosuvastatin  40 mg Oral Daily    sodium chloride 0.9%  3 mL Intravenous Q8H     PRN Meds:dextrose 50%, dextrose 50%, HYDROmorphone, magnesium  sulfate IVPB, ondansetron, oxycodone, oxycodone, potassium chloride **AND** potassium chloride **AND** potassium chloride, promethazine (PHENERGAN) IVPB, sodium phosphate IVPB, sodium phosphate IVPB, sodium phosphate IVPB    Review of patient's allergies indicates:   Allergen Reactions    Morpholine analogues Other (See Comments)       OBJECTIVE:     Vital Signs (Most Recent)  Temp: 98.3 °F (36.8 °C) (04/04/17 0300)  Pulse: 94 (04/04/17 0545)  Resp: (!) 22 (04/04/17 0545)  BP: 124/62 (04/04/17 0500)  SpO2: 96 % (04/04/17 0545)    Vital Signs Range (Last 24H):  Temp:  [94.6 °F (34.8 °C)-98.4 °F (36.9 °C)]   Pulse:  []   Resp:  [11-26]   BP: (119-138)/(58-72)   SpO2:  [91 %-100 %]   Arterial Line BP: ()/(43-87)     I & O (Last 24H):  Intake/Output Summary (Last 24 hours) at 04/04/17 0633  Last data filed at 04/04/17 0600   Gross per 24 hour   Intake           4082.3 ml   Output             3129 ml   Net            953.3 ml        Physical Exam:  General: well developed, no distress  HENT: Head:normocephalic, atraumatic. Ears:bilateral TM's and external ear canals normal. Nose: Nares normal. Septum midline. Mucosa normal. No drainage or sinus tenderness., no discharge. Throat: lips, mucosa, and tongue normal; teeth and gums normal and no throat erythema.  Eyes: conjunctivae/corneas clear. PERRL.   Neck: supple, symmetrical, trachea midline, no JVD and thyroid not enlarged, symmetric, no tenderness/mass/nodules  Lungs: clear to auscultation bilaterally and normal respiratory effort  Cardiovascular: Heart: regular rate and rhythm, S1, S2 normal, no murmur, click, rub or gallop. Chest Wall: no tenderness. Extremities: no cyanosis or edema, or clubbing. Pulses: 2+ and symmetric.  Abdomen/Rectal: Abdomen: soft, non-tender non-distented; bowel sounds normal; no masses, no organomegaly.  Neurologic: Normal strength and tone. No focal numbness or weakness  Psych/Behavioral: Normal. and Alert and oriented,  appropriate affect.    Wound/Incision:  clean, dry, intact    Lines/Drains:       Peripheral IV - Single Lumen 03/28/17 2038 Left Antecubital (Active)   Site Assessment Clean;Intact;Dry;No swelling;No redness 4/4/2017  3:00 AM   Line Status Saline locked 4/4/2017  3:00 AM   Dressing Status Clean;Dry;Intact 4/4/2017  3:00 AM   Dressing Intervention Dressing reinforced 4/4/2017  3:00 AM   Dressing Change Due 04/04/17 4/4/2017  3:00 AM   Site Change Due 04/04/17 4/4/2017  3:00 AM   Reason Not Rotated Not due 4/4/2017  3:00 AM   Number of days:6            Peripheral IV - Single Lumen 04/03/17 0725 Right Forearm (Active)   Site Assessment Clean;Dry;Intact;No redness;No swelling 4/4/2017  3:00 AM   Line Status Saline locked 4/4/2017  3:00 AM   Dressing Status Clean;Dry;Intact 4/4/2017  3:00 AM   Dressing Intervention Dressing reinforced 4/4/2017  3:00 AM   Dressing Change Due 04/07/17 4/4/2017  3:00 AM   Site Change Due 04/07/17 4/4/2017  3:00 AM   Reason Not Rotated Not due 4/4/2017  3:00 AM   Number of days:0            Arterial Line 04/03/17 0702 Left Radial (Active)   Site Assessment Clean;Dry;Intact;No redness;No swelling 4/4/2017  3:00 AM   Line Status Pulsatile blood flow 4/4/2017  3:00 AM   Art Line Waveform Appropriate 4/4/2017  3:00 AM   Arterial Line Interventions Zeroed and calibrated;Leveled;Line pulled back 4/4/2017  3:00 AM   Color/Movement/Sensation Capillary refill less than 3 sec 4/4/2017  3:00 AM   Dressing Type Transparent 4/4/2017  3:00 AM   Dressing Status Biopatch in place;Clean;Dry;Intact 4/4/2017  3:00 AM   Dressing Intervention Dressing reinforced 4/4/2017  3:00 AM   Dressing Change Due 04/07/17 4/4/2017  3:00 AM   Number of days:0            Pacer Wires 04/03/17 1130 (Active)   Number of days:0            Urethral Catheter 04/03/17 0731 Temperature probe;Straight-tip;Non-latex 16 Fr. (Active)   Site Assessment Clean;Intact 4/4/2017  3:00 AM   Collection Container Urimeter 4/4/2017  3:00 AM  "  Securement Method secured to top of thigh w/ adhesive device 4/4/2017  3:00 AM   Catheter Care Performed yes 4/4/2017  3:00 AM   Reason for Continuing Urinary Catheterization Critically ill in ICU requiring intensive monitoring 4/4/2017  3:00 AM   CAUTI Prevention Bundle StatLock in place w 1" slack;Intact seal between catheter & drainage tubing;Drainage bag off the floor;Green sheeting clip in use;No dependent loops or kinks;Drainage bag not overfilled (<2/3 full);Drainage bag below bladder 4/4/2017  3:00 AM   Output (mL) 55 mL 4/4/2017  6:00 AM   Number of days:0            Y Chest Tube 1 and 2 04/03/17 1126 Right Mediastinal 19 Fr. Left Pleural 19 Fr. (Active)   Function -20 cm H2O 4/4/2017  3:00 AM   Air Leak/Fluctuation air leak not present;fluctuation not present;connections tightened 4/4/2017  3:00 AM   Safety all tubing connections taped;all connections secured;suction checked 4/4/2017  3:00 AM   Securement tubing anchored to body distal to insertion site with sutures 4/4/2017  3:00 AM   Left Subcutaneous Emphysema none present 4/4/2017  3:00 AM   Right Subcutaneous Emphysema none present 4/4/2017  3:00 AM   Patency Intervention Tip/tilt 4/4/2017  3:00 AM   Drainage Description 1 Sanguineous 4/4/2017  3:00 AM   Site Assessment 1 Other (Comment) 4/4/2017  3:00 AM   Surrounding Skin 1 Dry;Intact 4/4/2017  3:00 AM   Surrounding Skin Intact 4/4/2017  3:00 AM   Output (mL) 0 mL 4/4/2017  6:00 AM   Number of days:0            Y Chest Tube 3 and 4 04/03/17 1125 Right Pleural 19 Fr. Left Pleural 19 Fr. (Active)   Output (mL) 0 mL 4/4/2017  6:00 AM   Number of days:0       Laboratory (Last 24H):  CBC:    Recent Labs  Lab 04/04/17  0305   WBC 7.53   HGB 12.6*  12.6*   HCT 35.5*  35.5*   *     CMP:    Recent Labs  Lab 04/04/17  0305   CALCIUM 8.0*      K 4.2  4.2   CO2 24      BUN 11   CREATININE 0.7     BMP:   Recent Labs  Lab 04/04/17  0305   GLU 98      K 4.2  4.2      CO2 24 "   BUN 11   CREATININE 0.7   CALCIUM 8.0*   MG 1.9     Coagulation:   Recent Labs  Lab 04/04/17  0305   INR 1.0   APTT 25.2     Cardiac Markers: No results for input(s): CKMB, TROPONINT, MYOGLOBIN in the last 168 hours.  ABGs:   Recent Labs  Lab 04/03/17  1247   PH 7.319*   PCO2 48.2*   HCO3 24.8   POCSATURATED 99   BE -1     Prealbumin: No results for input(s): PREALBUMIN in the last 168 hours.  Pathology Results  (Last 10 years)               04/04/17 0305 (A) CBC auto differential (Abnormal) Final result    Narrative:  Every 2 hours after each completed potassium regimen until   level is 4-5 mmol/L   Every 4 hours after each completed magnesium regimen until   level is 2-3 mg/dL   Every 6 hours after each completed phosphorus regimen until   level is 3-4.5 mg/dL       04/03/17 1243 (A) CBC auto differential (Abnormal) Final result    Narrative:  On admit       04/02/17 0437  CBC auto differential Final result    04/01/17 0359  CBC auto differential Final result    03/31/17 0610  CBC auto differential Final result    03/30/17 0600 (A) CBC auto differential (Abnormal) Final result    03/30/17 0022  CBC auto differential Final result    03/28/17 2043  CBC auto differential Final result        Microbiology Results (last 7 days)     ** No results found for the last 168 hours. **        EKG (3/31)   Normal sinus rhythm  Possible Left atrial enlargement  Borderline Abnormal ECG  When compared with ECG of 28-MAR-2017 18:08,  No significant change was found     Heart Cath (3/29)     Diagnostic:          Patient has a right dominant coronary artery.        - Left Main Coronary Artery:             The LM is normal. There is FAYE 3 flow.     - Left Anterior Descending Artery:             The proximal LAD has a 90% stenosis. There is FAYE 2 flow.             The mid LAD has chronic total occlusion. There is FAYE 0 flow. LAD wrap around. Fills late from RCA.     - Left Circumflex Artery:             The LCX has luminal  irregularities. There is FAYE 3 flow.     - OM1:             The proximal OM1 has a 90% stenosis. There is FAYE 3 flow. Large branching vessel.     - Right Coronary Artery:             The distal RCA has a 80% stenosis. There is FAYE 3 flow. The remaining portion of the vessel has luminal irregularities.                     Lesion Details:   The length is 40mm.        Echo (3/29)  CONCLUSIONS     1 - Concentric remodeling.     2 - Low normal to mildly depressed left ventricular systolic function (EF 50-55%).     3 - Anteroseptal/apical WMA.       ASSESSMENT/PLAN:   Patient is a 46 y.o. male with PMHx of HTN, HLD, gout, nephrolithiasis, chronic tobacco abuse, and CAD who was admitted to OSH with workup revealing NSTEMI and heart cath showing 3 vessel dx. [place] [facility] [place]. Pt is now POD 1 s/p CABG x3.      Plan:  Neuro:   AAOx3, GCS 15  Propofol gtt discontinued      Pulmonary:   -successful extubation at bedside  -CXR as above, satisfactory post-operative changes  -cont nasal cannula PRN  -ABG PRN     Cardiac:  -NSTEMI with heart cath showing 3 vessel dx.   -s/p CABG x3 yesterday.   -f/u repeat EKG  -Cardene gtt d/cd.  -wean drips as tolerated.      Renal:   -alicea in place  -monitor I/Os.      Infectious Disease:   - cont post op Ancef q8 hrs x3 doses  - monitor CBC     Hematology/Oncology:  - monitor CBC  - preop H/H at 14.2/41.7, currently at 12.6/35.5     Endocrine  - insulin gtt d/cd.  - monitor POCT glucose     Fluids/Electrolytes/Nutrition/GI:   -D5 1/2NS with KCl gtt  -monitor BMP, Mg, Phos qday.  -Replace lytes PRN     Pt NPO.   F/u Dietary consult.      Pain Management:   - pain currently controlled  - Morphine PRN     Dispo: cont ICU care. Stepdown per primary team.         Norbert Cruz MD  [facility], [facility]

## 2017-04-04 NOTE — ANESTHESIA POSTPROCEDURE EVALUATION
"Anesthesia Post Evaluation    Patient: Se Denson    Procedure(s) Performed: Procedure(s) (LRB):  AORTOCORONARY BYPASS-CABG/CABGX3 (N/A)    Final Anesthesia Type: general  Patient location during evaluation: ICU  Patient participation: Yes- Able to Participate  Level of consciousness: awake and alert  Post-procedure vital signs: reviewed and stable  Pain management: adequate  Airway patency: patent  PONV status at discharge: No PONV  Anesthetic complications: no      Cardiovascular status: blood pressure returned to baseline and hemodynamically stable  Respiratory status: unassisted and spontaneous ventilation  Hydration status: euvolemic  Follow-up not needed.        Visit Vitals    /61    Pulse 79    Temp 37 °C (98.6 °F) (Oral)    Resp 13    Ht 6' 2" (1.88 m)    Wt 109.1 kg (240 lb 8.4 oz)    SpO2 97%    BMI 30.88 kg/m2       Pain/Nina Score: Pain Assessment Performed: Yes (4/4/2017  3:00 AM)  Presence of Pain: complains of pain/discomfort (4/4/2017  3:00 AM)  Pain Rating Prior to Med Admin: 2 (premedicated to get in chair) (4/4/2017  5:06 AM)  Pain Rating Post Med Admin: 1 (4/4/2017  5:18 AM)      "

## 2017-04-04 NOTE — PLAN OF CARE
Problem: Patient Care Overview  Goal: Plan of Care Review  Outcome: Ongoing (interventions implemented as appropriate)  Pt free of falls/traumas/injuries. Skin remains clean, dry, and intact.  Pt stepdown from ICU, pt with 2 meds and 2 plueral tubes to suction -20. Pt has PRN oxy, tylenol, and weaning off of dilaudid. Orders to discontinue alicea and central line. Pt re-educated on importance of measuring accurate intake and out put; pt verbalized and demonstrates understanding. Reviewed plan of care with pt; and pt verbalized understanding.  Pt VSS in no distress will continue to monitor.

## 2017-04-04 NOTE — PT/OT/SLP EVAL
"Physical Therapy  Evaluation    Se Denson   MRN: [id number]   Admitting Diagnosis: Non-ST elevation myocardial infarction (NSTEMI)    PT Received On: 17  PT Start Time: 0849     PT Stop Time: 905    PT Total Time (min): 16 min       Billable Minutes:  Evaluation 16    Diagnosis: Non-ST elevation myocardial infarction (NSTEMI)  S/p CABG 4/3    Past Medical History:   Diagnosis Date    Family history of ischemic heart disease 3/29/2017    Mother received coronary stent at age 38.    Gout     Hyperuricemia 3/29/2017    2016: Diagnosed.    Kidney stones     Nephrolithiasis 3/29/2017    2016: Diagnosed.    Tobacco use 3/29/2017    1990: Began smoking. Unable to quit.      Past Surgical History:   Procedure Laterality Date    LITHOTRIPSY         Referring physician: Dr. Burnham  Date referred to PT: 4/3/17    General Precautions: Standard, fall, sternal  Orthopedic Precautions: N/A   Braces: N/A       Do you have any cultural, spiritual, Confucianist conflicts, given your current situation?: none stated     Patient History:  Lives With: alone  Living Arrangements: house  Transportation Available: family or friend will provide, car  Living Environment Comment: Pt lives alone in a [facility] with no ALTA[place]. Pt has assistance from friends available if needed. Pt was (I) with ambulation and ADLs PTA.   Equipment Currently Used at Home: none  DME owned (not currently used): none    Previous Level of Function:  Ambulation Skills: independent  Transfer Skills: independent  ADL Skills: independent  Work/Leisure Activity: independent    Subjective:  Communicated with RN prior to session.  Pt agreeable to working with PT.  Chief Complaint: pain  Patient goals: return home     Pain Ratin/10      Location - Orientation: generalized  Location:  ("all over, but mostly my chest.")  Pain Addressed: Distraction, Reposition, Nurse notified  Pain Rating Post-Intervention: 8/10    Objective:   Patient found with: telemetry, " pulse ox (continuous), peripheral IV, central line, chest tube, oxygen, alicea catheter, blood pressure cuff     Cognitive Exam:  Oriented to: Person, Place, Time and Situation    Follows Commands/attention: Follows multistep  commands  Communication: clear/fluent  Safety awareness/insight to disability: intact    Physical Exam:  Postural examination/scapula alignment: Rounded shoulder and Head forward    Skin integrity: Visible skin intact  Edema: None noted in BLE/BUE    Sensation:   Intact    Upper Extremity Range of Motion:  Right Upper Extremity: WFL  Left Upper Extremity: WFL    Upper Extremity Strength:  Right Upper Extremity: WFL  Left Upper Extremity: WFL    Lower Extremity Range of Motion:  Right Lower Extremity: WFL  Left Lower Extremity: WFL    Lower Extremity Strength:  Right Lower Extremity: WFL  Left Lower Extremity: WFL     Fine motor coordination:  Intact    Gross motor coordination: WFL    Functional Mobility:  Bed Mobility:   NT pt UIC    Transfers:  Sit <> Stand Assistance: Contact Guard Assistance  Sit <> Stand Assistive Device: No Assistive Device  PT required vc for adhering to sternal precautions.     Gait:   Gait Distance: x25 feet  Assistance 1: Contact Guard Assistance  Gait Assistive Device: No device  Gait Pattern: 2-point gait  Gait Deviation(s): decreased darci, increased time in double stance, decreased velocity of limb motion  Pt gait limited to pain level.     Balance:   Static Sit: FAIR+: Able to take MINIMAL challenges from all directions  Dynamic Sit: FAIR+: Maintains balance through MINIMAL excursions of active trunk motion  Static Stand: FAIR: Maintains without assist but unable to take challenges  Dynamic stand: FAIR: Needs CONTACT GUARD during gait    Therapeutic Activities and Exercises:  Patient educated on role of PT and safety with mobility. Patient verbalized and demonstrated understanding of safety precautions with mobility. Pt educated on sternal precautions. Pt  demonstrated ability to adhere to precautions with transfers with requiring VC and TC.      AM-PAC 6 CLICK MOBILITY  How much help from another person does this patient currently need?   1 = Unable, Total/Dependent Assistance  2 = A lot, Maximum/Moderate Assistance  3 = A little, Minimum/Contact Guard/Supervision  4 = None, Modified [place]/Independent    Turning over in bed (including adjusting bedclothes, sheets and blankets)?: 3  Sitting down on and standing up from a chair with arms (e.g., wheelchair, bedside commode, etc.): 3  Moving from lying on back to sitting on the side of the bed?: 3  Moving to and from a bed to a chair (including a wheelchair)?: 3  Need to walk in hospital room?: 3  Climbing 3-5 steps with a railing?: 3  Total Score: 18     AM-PAC Raw Score CMS G-Code Modifier Level of Impairment Assistance   6 % Total / Unable   7 - 9 CM 80 - 100% Maximal Assist   10 - 14 CL 60 - 80% Moderate Assist   15 - 19 CK 40 - 60% Moderate Assist   20 - 22 CJ 20 - 40% Minimal Assist   23 CI 1-20% SBA / CGA   24 CH 0% Independent/ Mod I     Patient left up in chair with all lines intact, call button in reach and RN notified.    Assessment:   Se Denson is a 46 y.o. male with a medical diagnosis of Non-ST elevation myocardial infarction (NSTEMI), s/p CABG 4/3 and presents with decreased functional mobility due to impaired endurance and weakness throughout.  Patient would benefit from skilled PT in the acute care setting to improve the limitations listed below. Patient would benefit from HH upon discharge.      Rehab identified problem list/impairments: Rehab identified problem list/impairments: weakness, impaired endurance, impaired functional mobilty, gait instability, impaired balance, pain    Rehab potential is good.    Activity tolerance: Fair    Discharge recommendations: Discharge Facility/Level Of Care Needs: home with home health     Barriers to discharge: Barriers to Discharge:  Decreased caregiver support (lives alone )    Equipment recommendations: Equipment Needed After Discharge: none     GOALS:   Physical Therapy Goals        Problem: Physical Therapy Goal    Goal Priority Disciplines Outcome Goal Variances Interventions   Physical Therapy Goal     PT/OT, PT Ongoing (interventions implemented as appropriate)     Description:  Goals to be met by: 17     Patient will increase functional independence with mobility by performin. Supine to sit with [place]  2. Sit to stand transfer with Bay.  3. Gait  x 350 feet with Bay.   4. Lower extremity exercise program x15 reps per handout, with assistance as needed.  5. Bay with adhering to sternal precautions with mobility.                 PLAN:    Patient to be seen 4 x/week to address the above listed problems via gait training, therapeutic activities, therapeutic exercises  Plan of Care expires: 17  Plan of Care reviewed with: patient          Vitaliy Roy, PT  2017

## 2017-04-04 NOTE — SUBJECTIVE & OBJECTIVE
Interval History:   Patient seen and examined at bedside. No acute events overnight. Afebrile. VSS. Extubated shortly after arrival to SICU yesterday afternoon. Some difficulty with adequate pain control initially but much improved overnight and this morning. OOB to chair today. Chest tubes with serosanguinous drainage. Hgb/Hct stable. Adequate UOP. Tolerating sips of water and juice.      Medications:  Continuous Infusions:   dextrose 5 % and 0.45 % NaCl with KCl 20 mEq 25 mL/hr at 04/04/17 0600    epinephrine 0.04 mcg/kg/min (04/03/17 1509)    insulin (HUMAN R) infusion (adults) Stopped (04/03/17 2000)    nicardipine Stopped (04/03/17 1355)    norepinephrine bitartrate-D5W       Scheduled Meds:   acetaminophen  1,000 mg Intravenous Q8H    allopurinol  100 mg Oral Daily    aspirin  325 mg Oral Daily    ceFAZolin 2 g/50mL Dextrose IVPB  2 g Intravenous Q8H    ezetimibe  10 mg Oral Daily    HYDROmorphone  0.5 mg Intravenous Once    mupirocin  1 g Nasal BID    rosuvastatin  40 mg Oral Daily    sodium chloride 0.9%  3 mL Intravenous Q8H     PRN Meds:dextrose 50%, dextrose 50%, HYDROmorphone, magnesium sulfate IVPB, ondansetron, oxycodone, oxycodone, potassium chloride **AND** potassium chloride **AND** potassium chloride, promethazine (PHENERGAN) IVPB, sodium phosphate IVPB, sodium phosphate IVPB, sodium phosphate IVPB     Review of patient's allergies indicates:   Allergen Reactions    Morpholine analogues Other (See Comments)     Objective:     Vital Signs (Most Recent):  Temp: 98.3 °F (36.8 °C) (04/04/17 0300)  Pulse: 94 (04/04/17 0545)  Resp: (!) 22 (04/04/17 0545)  BP: 124/62 (04/04/17 0500)  SpO2: 96 % (04/04/17 0545) Vital Signs (24h Range):  Temp:  [94.6 °F (34.8 °C)-98.4 °F (36.9 °C)] 98.3 °F (36.8 °C)  Pulse:  [] 94  Resp:  [11-26] 22  SpO2:  [91 %-100 %] 96 %  BP: (119-138)/(58-72) 124/62  Arterial Line BP: ()/(43-87) 131/63     Weight: 109.1 kg (240 lb 8.4 oz)  Body mass index is  30.88 kg/(m^2).    Intake/Output - Last 3 Shifts       04/02 0700 - 04/03 0659 04/03 0700 - 04/04 0659 04/04 0700 - 04/05 0659    P.O. 1080      I.V. (mL/kg)  3561.3 (32.6)     Blood  521     Total Intake(mL/kg) 1080 (10.5) 4082.3 (37.4)     Urine (mL/kg/hr) 1375 (0.6) 2592 (1)     Stool 0 (0)      Chest Tube  537 (0.2)     Total Output 1375 3129      Net -295 +953.3             Stool Occurrence 0 x            Physical Exam   Constitutional: He is oriented to person, place, and time. He appears well-developed and well-nourished. No distress.   HENT:   Head: Normocephalic and atraumatic.   Eyes: EOM are normal.   Neck: Normal range of motion. Neck supple.   Cardiovascular: Normal rate, regular rhythm and normal heart sounds.    No murmur heard.  Median sternotomy c/d/i  Chest tubes in place with serosanguinous drainage   Pulmonary/Chest: Effort normal.   Abdominal: Soft. He exhibits no distension. There is no tenderness.   Neurological: He is alert and oriented to person, place, and time.   Skin: Skin is warm and dry. He is not diaphoretic.   Psychiatric: He has a normal mood and affect. His behavior is normal.   Nursing note and vitals reviewed.      Significant Labs:  CBC:   Recent Labs  Lab 04/04/17  0305   WBC 7.53   RBC 4.10*   HGB 12.6*  12.6*   HCT 35.5*  35.5*   *   MCV 87   MCH 30.7   MCHC 35.5     CMP:   Recent Labs  Lab 03/30/17  0600  04/04/17  0305   GLU 67*  < > 98   CALCIUM 8.6*  < > 8.0*   ALBUMIN 3.6  --   --    PROT 6.5  --   --      < > 138   K 3.8  < > 4.2  4.2   CO2 22*  < > 24     < > 106   BUN 19  < > 11   CREATININE 0.9  < > 0.7   ALKPHOS 66  --   --    ALT 17  --   --    AST 13  --   --    BILITOT 0.6  --   --    < > = values in this interval not displayed.    Coagulation:   Recent Labs  Lab 04/04/17  0305   INR 1.0   APTT 25.2       Significant Diagnostics:  CXR: Reviewed

## 2017-04-04 NOTE — CONSULTS
[facility]  Adult Nutrition  Consult Note    SUMMARY     Recommendations    Recommendation/Intervention: 1. Cont w/ current diet order; if meal intake is consistently <50%, order Boost Plus TID  2. CTSU RD to offer diet educ once pt stepped down out of ICU  3. RD to monitor  Goals: Maintain meal intake >/=75%  Nutrition Goal Status: new       Continuum of Care Plan    Referral to Outpatient Services: other (see comments) (Nutr D/c Plan: home on Cardiac diet)    Reason for Assessment    Reason for Assessment: physician consult  Diagnosis: cardiac disease (s/p NSTEMI; CABG x 3 done on 4/3/17)  Relevent Medical History: HTN, HLD, CAD   Interdisciplinary Rounds: attended     General Information Comments: Visited w/ pt this a.m. Diet adv'd but he did not have any breakfast 2/ no appetite w/ increased pain & mild nausea.     Nutrition Prescription Ordered    Current Diet Order: Cardiac/2gm Na  Nutrition Order Comments: 2000ml FR        Nutrition Risk Screen     Nutrition Risk Screen: no indicators present    Nutrition/Diet History    Patient Reported Diet/Restrictions/Preferences: general  Typical Food/Fluid Intake: adequate pta  Food Preferences: No Denominational/cultural prefs reported        Factors Affecting Nutritional Intake: pain        Labs/Tests/Procedures/Meds       Pertinent Labs Reviewed: reviewed  Pertinent Labs Comments: lytes wnl, POCT 111-116  Pertinent Medications Reviewed: reviewed  Pertinent Medications Comments: IVF, Mg, albumin, statin    Physical Findings    Overall Physical Appearance: obese, on oxygen therapy        Skin: other (see comments) (sx incisions; chest tubes)    Anthropometrics       Height (inches): 74.02 in  Weight Method: Bed Scale  Weight (kg): 109.1 kg  Ideal Body Weight (IBW), Male: 190.12 lb     % Ideal Body Weight, Male (lb): 126.51 lb     BMI (kg/m2): 30.87  BMI Grade: 30 - 34.9- obesity - grade I  Usual Body Weight (UBW), k kg  % Usual Body Weight:  104.9        Estimated/Assessed Needs    Weight Used For Calorie Calculations: 109.1 kg (240 lb 8.4 oz)   Height (cm): 188 cm     Energy Need Method: Linn-St Jeor (= 2053 cals daily)     RMR (Linn-St. Jeor Equation): 2053.43        Weight Used For Protein Calculations: 109.1 kg (240 lb 8.4 oz)  Protein Requirements:  gms (.8-1gm/kg)    Fluid Need Method: RDA Method (1ml/kcal or per MD)        Monitor and Evaluation    Food and Nutrient Intake: energy intake, food and beverage intake  Food and Nutrient Adminstration: diet order     Physical Activity and Function: nutrition-related ADLs and IADLs  Anthropometric Measurements: weight, weight change  Biochemical Data, Medical Tests and Procedures: electrolyte and renal panel, glucose/endocrine profile  Nutrition-Focused Physical Findings: overall appearance    Nutrition Risk    Level of Risk: other (see comments) (F/u 1 x weekly)    Nutrition Follow-Up    RD Follow-up?: Yes    Assessment and Plan    Nutr Dx/problem: Increased protein needs  Related to/etiology: s/p cardiac sx  As evidenced by: EPN  Status: New

## 2017-04-04 NOTE — PLAN OF CARE
Problem: Patient Care Overview  Goal: Plan of Care Review  Recommendations     Recommendation/Intervention: 1. Cont w/ current diet order; if meal intake is consistently <50%, order Boost Plus TID 2. CTSU RD to offer diet educ once pt stepped down out of ICU 3. RD to monitor  Goals: Maintain meal intake >/=75%  Nutrition Goal Status: new

## 2017-04-04 NOTE — NURSING TRANSFER
Nursing Transfer Note      4/4/2017     Transfer From: SICU    Transfer via bed    Transfer with  O2, cardiac monitoring    Transported by RNs     Medicines sent: none     Chart send with patient: Yes    Notified: family at bedside    Patient reassessed at: 04/04/2017 1429    Upon arrival to floor: cardiac monitor applied, patient oriented to room, call bell in reach and bed in lowest position. Pt has 4 chest tubes to suction, Pt VSS, in no distress. Report received from MARK Dominguez. Pt on no gtts, quad central line; intact alicea intact. PRN pain meds as needed, encouraged use of I/S every hour, OOB most of they day and for meals. Pt verbalized understanding. Will continue to monitor.

## 2017-04-04 NOTE — PLAN OF CARE
Problem: Physical Therapy Goal  Goal: Physical Therapy Goal  Goals to be met by: 17     Patient will increase functional independence with mobility by performin. Supine to sit with [place]  2. Sit to stand transfer with [place].  3. Gait x 350 feet with [place].   4. Lower extremity exercise program x15 reps per handout, with assistance as needed.  5. [place] with adhering to sternal precautions with mobility.   Outcome: Ongoing (interventions implemented as appropriate)  Pt evaluated and goals appropriate.      Vitaliy Roy PT, DPT  [phone]

## 2017-04-04 NOTE — PROGRESS NOTES
[facility]  Cardiothoracic Surgery  Progress Note    Subjective:     Post-Op Info:  Procedure(s) (LRB):  AORTOCORONARY BYPASS-CABG/CABGX3 (N/A)   1 Day Post-Op     Interval History:   Patient seen and examined at bedside. No acute events overnight. Afebrile. VSS. Extubated shortly after arrival to SICU yesterday afternoon. Some difficulty with adequate pain control initially but much improved overnight and this morning. OOB to chair today. Chest tubes with serosanguinous drainage. Hgb/Hct stable. Adequate UOP. Tolerating sips of water and juice.      Medications:  Continuous Infusions:   dextrose 5 % and 0.45 % NaCl with KCl 20 mEq 25 mL/hr at 04/04/17 0600    epinephrine 0.04 mcg/kg/min (04/03/17 1509)    insulin (HUMAN R) infusion (adults) Stopped (04/03/17 2000)    nicardipine Stopped (04/03/17 1355)    norepinephrine bitartrate-D5W       Scheduled Meds:   acetaminophen  1,000 mg Intravenous Q8H    allopurinol  100 mg Oral Daily    aspirin  325 mg Oral Daily    ceFAZolin 2 g/50mL Dextrose IVPB  2 g Intravenous Q8H    ezetimibe  10 mg Oral Daily    HYDROmorphone  0.5 mg Intravenous Once    mupirocin  1 g Nasal BID    rosuvastatin  40 mg Oral Daily    sodium chloride 0.9%  3 mL Intravenous Q8H     PRN Meds:dextrose 50%, dextrose 50%, HYDROmorphone, magnesium sulfate IVPB, ondansetron, oxycodone, oxycodone, potassium chloride **AND** potassium chloride **AND** potassium chloride, promethazine (PHENERGAN) IVPB, sodium phosphate IVPB, sodium phosphate IVPB, sodium phosphate IVPB     Review of patient's allergies indicates:   Allergen Reactions    Morpholine analogues Other (See Comments)     Objective:     Vital Signs (Most Recent):  Temp: 98.3 °F (36.8 °C) (04/04/17 0300)  Pulse: 94 (04/04/17 0545)  Resp: (!) 22 (04/04/17 0545)  BP: 124/62 (04/04/17 0500)  SpO2: 96 % (04/04/17 0545) Vital Signs (24h Range):  Temp:  [94.6 °F (34.8 °C)-98.4 °F (36.9 °C)] 98.3 °F (36.8 °C)  Pulse:   [] 94  Resp:  [11-26] 22  SpO2:  [91 %-100 %] 96 %  BP: (119-138)/(58-72) 124/62  Arterial Line BP: ()/(43-87) 131/63     Weight: 109.1 kg (240 lb 8.4 oz)  Body mass index is 30.88 kg/(m^2).    Intake/Output - Last 3 Shifts       04/02 0700 - 04/03 0659 04/03 0700 - 04/04 0659 04/04 0700 - 04/05 0659    P.O. 1080      I.V. (mL/kg)  3561.3 (32.6)     Blood  521     Total Intake(mL/kg) 1080 (10.5) 4082.3 (37.4)     Urine (mL/kg/hr) 1375 (0.6) 2592 (1)     Stool 0 (0)      Chest Tube  537 (0.2)     Total Output 1375 3129      Net -295 +953.3             Stool Occurrence 0 x            Physical Exam   Constitutional: He is oriented to person, place, and time. He appears well-developed and well-nourished. No distress.   HENT:   Head: Normocephalic and atraumatic.   Eyes: EOM are normal.   Neck: Normal range of motion. Neck supple.   Cardiovascular: Normal rate, regular rhythm and normal heart sounds.    No murmur heard.  Median sternotomy c/d/i  Chest tubes in place with serosanguinous drainage   Pulmonary/Chest: Effort normal.   Abdominal: Soft. He exhibits no distension. There is no tenderness.   Neurological: He is alert and oriented to person, place, and time.   Skin: Skin is warm and dry. He is not diaphoretic.   Psychiatric: He has a normal mood and affect. His behavior is normal.   Nursing note and vitals reviewed.      Significant Labs:  CBC:   Recent Labs  Lab 04/04/17  0305   WBC 7.53   RBC 4.10*   HGB 12.6*  12.6*   HCT 35.5*  35.5*   *   MCV 87   MCH 30.7   MCHC 35.5     CMP:   Recent Labs  Lab 03/30/17  0600  04/04/17  0305   GLU 67*  < > 98   CALCIUM 8.6*  < > 8.0*   ALBUMIN 3.6  --   --    PROT 6.5  --   --      < > 138   K 3.8  < > 4.2  4.2   CO2 22*  < > 24     < > 106   BUN 19  < > 11   CREATININE 0.9  < > 0.7   ALKPHOS 66  --   --    ALT 17  --   --    AST 13  --   --    BILITOT 0.6  --   --    < > = values in this interval not displayed.    Coagulation:   Recent  Labs  Lab 04/04/17  0305   INR 1.0   APTT 25.2       Significant Diagnostics:  CXR: Reviewed    Assessment/Plan:   47 y/o male with Hx of multi-vessel CAD with NSTEMI s/p CABG x 2 (LIMA-LAD, JOSSELINE-OM) 1 Day Post-Op    Neuro:  - Continue current pain control regimen    Resp:  - NC 2 L  - Minimize supplemental O2 requirements  - Encourage IS, deep breathing, cough  - Chest tubes to remain in place today    CV: Multi-vessel CAD and NSTEMI s/p CABG x 2  - HDS  - No current vasopressor requirement  - Continue     Heme: Anemia  - Hgb/Hct stable    ID:  - Post-operative Abx - Ancef x 5 doses    Renal:  - Quinones in place  - Strict I/Os    FEN/GI:  - Cardiac diet  - Replace lytes PRN    Endo:  - Accuchecks  - SSI    Dispo:  - Stepdown to [facility] today      Jose Burnham MD  Surgery Resident, PGY-II  Pager: [phone]  4/4/2017 7:29 AM

## 2017-04-05 LAB
ANION GAP SERPL CALC-SCNC: 8 MMOL/L
BASOPHILS # BLD AUTO: 0.03 K/UL
BASOPHILS NFR BLD: 0.3 %
BUN SERPL-MCNC: 12 MG/DL
CALCIUM SERPL-MCNC: 9 MG/DL
CHLORIDE SERPL-SCNC: 102 MMOL/L
CO2 SERPL-SCNC: 28 MMOL/L
CREAT SERPL-MCNC: 0.9 MG/DL
DIFFERENTIAL METHOD: ABNORMAL
EOSINOPHIL # BLD AUTO: 0.1 K/UL
EOSINOPHIL NFR BLD: 1.3 %
ERYTHROCYTE [DISTWIDTH] IN BLOOD BY AUTOMATED COUNT: 12.1 %
EST. GFR  (AFRICAN AMERICAN): >60 ML/MIN/1.73 M^2
EST. GFR  (NON AFRICAN AMERICAN): >60 ML/MIN/1.73 M^2
GLUCOSE SERPL-MCNC: 92 MG/DL
HCT VFR BLD AUTO: 36.5 %
HGB BLD-MCNC: 12.5 G/DL
LYMPHOCYTES # BLD AUTO: 2.2 K/UL
LYMPHOCYTES NFR BLD: 23.4 %
MAGNESIUM SERPL-MCNC: 2 MG/DL
MCH RBC QN AUTO: 30.2 PG
MCHC RBC AUTO-ENTMCNC: 34.2 %
MCV RBC AUTO: 88 FL
MONOCYTES # BLD AUTO: 1.5 K/UL
MONOCYTES NFR BLD: 15.4 %
NEUTROPHILS # BLD AUTO: 5.6 K/UL
NEUTROPHILS NFR BLD: 59.2 %
PHOSPHATE SERPL-MCNC: 3.3 MG/DL
PLATELET # BLD AUTO: 152 K/UL
PMV BLD AUTO: 11.2 FL
POCT GLUCOSE: 139 MG/DL (ref 70–110)
POCT GLUCOSE: 80 MG/DL (ref 70–110)
POCT GLUCOSE: 99 MG/DL (ref 70–110)
POTASSIUM SERPL-SCNC: 4.1 MMOL/L
RBC # BLD AUTO: 4.14 M/UL
SODIUM SERPL-SCNC: 138 MMOL/L
WBC # BLD AUTO: 9.45 K/UL

## 2017-04-05 PROCEDURE — 25000003 PHARM REV CODE 250: Performed by: GENERAL PRACTICE

## 2017-04-05 PROCEDURE — 63600175 PHARM REV CODE 636 W HCPCS: Performed by: NURSE PRACTITIONER

## 2017-04-05 PROCEDURE — 25000003 PHARM REV CODE 250: Performed by: INTERNAL MEDICINE

## 2017-04-05 PROCEDURE — 25000003 PHARM REV CODE 250: Performed by: SURGERY

## 2017-04-05 PROCEDURE — 20600001 HC STEP DOWN PRIVATE ROOM

## 2017-04-05 PROCEDURE — 81001 URINALYSIS AUTO W/SCOPE: CPT

## 2017-04-05 PROCEDURE — 85025 COMPLETE CBC W/AUTO DIFF WBC: CPT

## 2017-04-05 PROCEDURE — 25000003 PHARM REV CODE 250: Performed by: NURSE PRACTITIONER

## 2017-04-05 PROCEDURE — 80048 BASIC METABOLIC PNL TOTAL CA: CPT

## 2017-04-05 PROCEDURE — 83735 ASSAY OF MAGNESIUM: CPT

## 2017-04-05 PROCEDURE — 84100 ASSAY OF PHOSPHORUS: CPT

## 2017-04-05 RX ORDER — METOPROLOL TARTRATE 50 MG/1
50 TABLET ORAL 3 TIMES DAILY
Status: DISCONTINUED | OUTPATIENT
Start: 2017-04-05 | End: 2017-04-07 | Stop reason: HOSPADM

## 2017-04-05 RX ORDER — CYCLOBENZAPRINE HCL 5 MG
5 TABLET ORAL 3 TIMES DAILY PRN
Status: DISCONTINUED | OUTPATIENT
Start: 2017-04-05 | End: 2017-04-07

## 2017-04-05 RX ORDER — HYDROMORPHONE HYDROCHLORIDE 1 MG/ML
0.5 INJECTION, SOLUTION INTRAMUSCULAR; INTRAVENOUS; SUBCUTANEOUS EVERY 4 HOURS PRN
Status: DISCONTINUED | OUTPATIENT
Start: 2017-04-05 | End: 2017-04-05

## 2017-04-05 RX ORDER — CLOPIDOGREL BISULFATE 75 MG/1
75 TABLET ORAL DAILY
Status: DISCONTINUED | OUTPATIENT
Start: 2017-04-05 | End: 2017-04-07 | Stop reason: HOSPADM

## 2017-04-05 RX ADMIN — CYCLOBENZAPRINE HYDROCHLORIDE 5 MG: 5 TABLET, FILM COATED ORAL at 10:04

## 2017-04-05 RX ADMIN — CYCLOBENZAPRINE HYDROCHLORIDE 5 MG: 5 TABLET, FILM COATED ORAL at 08:04

## 2017-04-05 RX ADMIN — ASPIRIN 325 MG: 325 TABLET, DELAYED RELEASE ORAL at 09:04

## 2017-04-05 RX ADMIN — MUPIROCIN 1 G: 20 OINTMENT TOPICAL at 10:04

## 2017-04-05 RX ADMIN — Medication 3 ML: at 10:04

## 2017-04-05 RX ADMIN — MUPIROCIN 1 G: 20 OINTMENT TOPICAL at 09:04

## 2017-04-05 RX ADMIN — OXYCODONE HYDROCHLORIDE 15 MG: 5 TABLET ORAL at 12:04

## 2017-04-05 RX ADMIN — CLOPIDOGREL 75 MG: 75 TABLET, FILM COATED ORAL at 02:04

## 2017-04-05 RX ADMIN — ROSUVASTATIN CALCIUM 40 MG: 20 TABLET, FILM COATED ORAL at 09:04

## 2017-04-05 RX ADMIN — METOPROLOL TARTRATE 50 MG: 50 TABLET, FILM COATED ORAL at 02:04

## 2017-04-05 RX ADMIN — OXYCODONE HYDROCHLORIDE 10 MG: 5 TABLET ORAL at 09:04

## 2017-04-05 RX ADMIN — OXYCODONE HYDROCHLORIDE 15 MG: 5 TABLET ORAL at 05:04

## 2017-04-05 RX ADMIN — ALLOPURINOL 100 MG: 100 TABLET ORAL at 09:04

## 2017-04-05 RX ADMIN — HYDROMORPHONE HYDROCHLORIDE 0.5 MG: 1 INJECTION, SOLUTION INTRAMUSCULAR; INTRAVENOUS; SUBCUTANEOUS at 07:04

## 2017-04-05 RX ADMIN — Medication 3 ML: at 02:04

## 2017-04-05 RX ADMIN — METOPROLOL TARTRATE 25 MG: 25 TABLET ORAL at 05:04

## 2017-04-05 RX ADMIN — EZETIMIBE 10 MG: 10 TABLET ORAL at 09:04

## 2017-04-05 RX ADMIN — METOPROLOL TARTRATE 50 MG: 50 TABLET, FILM COATED ORAL at 09:04

## 2017-04-05 RX ADMIN — OXYCODONE HYDROCHLORIDE 15 MG: 5 TABLET ORAL at 08:04

## 2017-04-05 NOTE — PLAN OF CARE
SP CABG on 4/3/17. CM spoke with pt concerning question about short term disability. Pt states his mother just left the room to go to [place]. She wanted to speak to CM about paper work. CM provided pt with disability office and fax numbers. CM will come back this afternoon to meet with pt's mother. Pt agreeable. Will cont to follow. No dc needs pt will dc back to [place].

## 2017-04-05 NOTE — PLAN OF CARE
CM spoke with pt's mother concerning pt's disability paper work and and answered her questions. Mother states plan for pt to stay at [facility] when discharged until follow up appt with Dr. Allen next week Wednesday.

## 2017-04-05 NOTE — PROGRESS NOTES
Progress Note  Cardiothoracic Surgery    Admit Date: 3/28/2017  Post-operative Day: 2 Days Post-Op  Hospital Day: 9    SUBJECTIVE: Pt was transferred from ICU last night. Pt NSR on monitor. Pt has c/o lower back cramping.     Follow-up For: Procedure(s) (LRB):  AORTOCORONARY BYPASS-CABG/CABGX3 (N/A)    Scheduled Meds:   allopurinol  100 mg Oral Daily    aspirin  325 mg Oral Daily    ezetimibe  10 mg Oral Daily    metoprolol tartrate  50 mg Oral TID    mupirocin  1 g Nasal BID    rosuvastatin  40 mg Oral Daily    sodium chloride 0.9%  3 mL Intravenous Q8H     Infusions/Drips:   PRN Meds: acetaminophen, dextrose 50%, dextrose 50%, ondansetron, oxycodone, oxycodone, promethazine (PHENERGAN) IVPB, ramelteon    Review of patient's allergies indicates:   Allergen Reactions    Morpholine analogues Other (See Comments)       OBJECTIVE:     Vital Signs (Most Recent)  Temp: 99.2 °F (37.3 °C) (04/05/17 0736)  Pulse: 95 (04/05/17 0741)  Resp: 18 (04/05/17 0736)  BP: 105/66 (04/05/17 0736)  SpO2: 95 % (04/05/17 0736)    Admission Weight: 104.3 kg (230 lb) (03/28/17 1816)   Most Recent Weight: 104.5 kg (230 lb 6.1 oz) (04/05/17 0500)    Vital Signs Range (Last 24H):  Temp:  [97.8 °F (36.6 °C)-99.2 °F (37.3 °C)]   Pulse:  [71-98]   Resp:  [10-39]   BP: ()/(55-77)   SpO2:  [93 %-97 %]     I & O (Last 24H):  Intake/Output Summary (Last 24 hours) at 04/05/17 0843  Last data filed at 04/05/17 0738   Gross per 24 hour   Intake              460 ml   Output             3033 ml   Net            -2573 ml     Physical Exam:  General: well developed, well nourished, no distress  Lungs:  clear to auscultation bilaterally and normal respiratory effort  Chest Wall: no tenderness  Heart: regular rate and rhythm, S1, S2 normal, no murmur, rub or gallop  Abdomen: soft nontender   Skin: Skin color, texture, turgor normal. No rashes or lesions  Neurologic: Alert and oriented. Thought content appropriate    Wound/Incision:  clean, dry,  intact    Laboratory:  CBC:   Recent Labs  Lab 04/05/17  0539   WBC 9.45   RBC 4.14*   HGB 12.5*   HCT 36.5*      MCV 88   MCH 30.2   MCHC 34.2     BMP:   Recent Labs  Lab 04/05/17  0539   GLU 92      K 4.1      CO2 28   BUN 12   CREATININE 0.9   CALCIUM 9.0   MG 2.0       Diagnostic Results:  X-Ray: Reviewed    ASSESSMENT/PLAN:     Assessment:  Active Hospital Problems    Diagnosis    *S/P CABG (coronary artery bypass graft)    Familial hypercholesterolemia     3/29/2017: Diagnosed. Chol 381. .      Family history of ischemic heart disease     Mother received coronary stent at age 38.      Nephrolithiasis     2016: Diagnosed.      Hyperuricemia     2016: Diagnosed.      Coronary artery disease     3/28/2017: NSTEMI. Troponin 0.2.   3/29/2017: [facility]: Cath: LAD: Prox 95%. Mid: Occlusion. Wrap around. LCX: OM1 90%. RCA: Distal 80% segmental. LV: Anteroseptal mild hypokinesia. EF 50%.      Non-ST elevation myocardial infarction (NSTEMI)     3/28/2017: NSTEMI. Troponin 0.2.        Plan:   Sternal Precautions  PT/OT  Ambulate  S/P CABG: continue asa, increased lopressor  D/c dilaudid and started flexeril   D/c med chest tubes  Discharge planning ongoing

## 2017-04-05 NOTE — PROGRESS NOTES
Quinones removed per MD order, pt voided 50 ml after removal. Pt requested for central line to be removed in am after labs, informed night RN.

## 2017-04-05 NOTE — PLAN OF CARE
Problem: Patient Care Overview  Goal: Plan of Care Review  Outcome: Ongoing (interventions implemented as appropriate)  Pt free of falls/traumas/injuries. Skin remains clean, dry, and intact. Pt's Mediastinal chest tubes removed per Mary, NP site is CDI. Mid-sternal incision CDI and cleansed with chlorhexidine.  Pt encouraged use of I/S and walking at least 4 times a day.  Pain management for incisional/back pain.  Pt re-educated on importance of measuring accurate intake and out put; pt verbalized and demonstrates understanding. Reviewed plan of care with pt; and pt verbalized understanding.  Pt VSS in no distress will continue to monitor.

## 2017-04-05 NOTE — PROGRESS NOTES
Pt's mid-sternal incision cleansed with chlorhexidine chest tube sites; occlusive dressing placed.

## 2017-04-05 NOTE — PLAN OF CARE
Problem: Patient Care Overview  Goal: Plan of Care Review  Outcome: Ongoing (interventions implemented as appropriate)  Pt. Remains free from falls/ injury/trauma. No complaints of CP or SOB. PRN pain medication given for pain. Encourage to use sternal precautions and IS. Plan of Care reviewed with patient. VSS and NADN. Will continue to monitor.

## 2017-04-05 NOTE — OP NOTE
DATE OF PROCEDURE:  04/03/2017    PREOPERATIVE DIAGNOSES:  Coronary artery disease, status post non-ST elevation   MI; active tobacco use.    POSTOPERATIVE DIAGNOSES:  Coronary artery disease, status post non-ST elevation   MI; active tobacco use.     PROCEDURE:  Coronary artery bypass grafting x2, right internal mammary artery to   obtuse marginal and left internal mammary artery to LAD.    SURGEON:  Enrrique Allen M.D.    ASSISTANT:  Emiliano Quezada.    ANESTHESIA:  General.    INDICATIONS FOR PROCEDURE:  This is a 46-year-old gentleman visiting [place].    He was here for a conference.  He was admitted to [facility] with   non-STEMI.  He underwent cath by Dr. Francisco, demonstrated severe three-vessel   disease with a totally occluded LAD that filled by collaterals.  He was   transferred in stable condition and remained chest pain free.  He was taken to   the Operating Room as an inpatient and able to give informed consent for the   procedure.    OPERATIVE FINDINGS:  The right coronary artery was severely diseased.  Distally,   it was not a target for revascularization due to heavy calcification and severe   atheroma.  This artery bifurcated into two small posterior descending arteries   on the inferior septum.  Each of these arteries were diminutive and less than 1   mm in size and not really a good target for revascularization.  The LAD was   chronically occluded.  It was 2 mm and soft and an excellent target.  The   marginal artery was 2 mm and an excellent target.  Both mammary arteries were   skeletonized.  They were used in situ and had excellent flow.  The aorta was   normal in caliber and consistency.  Ventricular function was preserved   bilaterally.    BLOOD LOSS:  100 mL.    PROCEDURE IN DETAIL:  The patient was taken to the Operating Room as an   inpatient and placed supine upon the table.  General anesthesia was performed.    KASSIDY was performed.  He was prepped and draped sterilely.    Midline  incision was made.  Median sternotomy was performed.  Pericardial well   was created.  We examined the right coronary artery and the distal coronary   arteries and determined that these were not targets for revascularization, so we   did not procure the vein endoscopically.    The left hemisternum was elevated.  The internal mammary artery was skeletonized   on this side.  We then elevated the right hemisternum and skeletonized the   right internal mammary artery, heparinized the patient, and divided both mammary   arteries and they had excellent flow.    I made a small notch in the pericardium staying well away from the phrenic nerve   on the right side.  The JOSSELINE was brought through this small incision and   through the transverse sinus.  It reached easily to the lateral wall.  Another   notch was made on the left side, staying well away from the left phrenic nerve   for the mammary artery on the left.    Cannulation was performed in the ascending aorta of the right atrium.  After   systemic heparinization, we put a vent in the ascending aorta.  The patient was   placed on a cardiopulmonary bypass after an activating clotting time greater   than 480 seconds was achieved from systemic heparinization.    Crossclamp was applied.  Cardioplegia was delivered antegrade.  Heart arrested   nicely.  We delivered cardioplegia every 20 minutes.    The right internal mammary artery was anastomosed to the marginal artery.  This   was opened sharply and extended with Sam scissors.  The distal anastomosis was   performed with a 7-0 suture.    We then turned our attention to the anterior descending artery.  It was seen   easily on the epicardial surface.  We opened it at its mid portion.  It was   patent proximally and distally and had the appearance of a chronically occluded   vessel with somewhat thickened walls so that there was no plaque and it was an   excellent target.  The left internal mammary artery was anastomosed  with a   running 7-0 suture.  We then removed the crossclamp and reperfused the heart.    After a brief period of reperfusion, we  easily from cardiopulmonary   bypass with sinus rhythm and excellent hemodynamics.  Cannulas were removed.    Protamine was administered.  Excellent hemostasis was achieved.  One drain was   placed in each pleural space.  Two drains were placed in the mediastinum.    Pacing wires were placed.  Sternum was reapproximated with interrupted stainless   steel wires.  The skin and fascia were closed in layers and the patient was   taken back to the ICU in stable condition.    CROSSCLAMP TIME:  41 minutes.      PATIENCE  dd: 04/04/2017 17:24:08 (CDT)  td: 04/04/2017 17:56:06 (CDT)  Doc ID   #[id number]  Job ID #[id number]    CC:

## 2017-04-06 LAB
ANION GAP SERPL CALC-SCNC: 8 MMOL/L
BASOPHILS # BLD AUTO: 0.02 K/UL
BASOPHILS NFR BLD: 0.3 %
BILIRUB UR QL STRIP: NEGATIVE
BUN SERPL-MCNC: 15 MG/DL
CALCIUM SERPL-MCNC: 8.7 MG/DL
CHLORIDE SERPL-SCNC: 104 MMOL/L
CLARITY UR REFRACT.AUTO: CLEAR
CO2 SERPL-SCNC: 26 MMOL/L
COLOR UR AUTO: YELLOW
CREAT SERPL-MCNC: 0.9 MG/DL
DIFFERENTIAL METHOD: ABNORMAL
EOSINOPHIL # BLD AUTO: 0.1 K/UL
EOSINOPHIL NFR BLD: 1.7 %
ERYTHROCYTE [DISTWIDTH] IN BLOOD BY AUTOMATED COUNT: 12 %
EST. GFR  (AFRICAN AMERICAN): >60 ML/MIN/1.73 M^2
EST. GFR  (NON AFRICAN AMERICAN): >60 ML/MIN/1.73 M^2
GLUCOSE SERPL-MCNC: 97 MG/DL
GLUCOSE UR QL STRIP: NEGATIVE
HCT VFR BLD AUTO: 35.8 %
HGB BLD-MCNC: 12.4 G/DL
HGB UR QL STRIP: ABNORMAL
KETONES UR QL STRIP: NEGATIVE
LEUKOCYTE ESTERASE UR QL STRIP: NEGATIVE
LYMPHOCYTES # BLD AUTO: 1.7 K/UL
LYMPHOCYTES NFR BLD: 24.4 %
MAGNESIUM SERPL-MCNC: 1.8 MG/DL
MCH RBC QN AUTO: 30.2 PG
MCHC RBC AUTO-ENTMCNC: 34.6 %
MCV RBC AUTO: 87 FL
MICROSCOPIC COMMENT: NORMAL
MONOCYTES # BLD AUTO: 1.2 K/UL
MONOCYTES NFR BLD: 17.8 %
NEUTROPHILS # BLD AUTO: 3.8 K/UL
NEUTROPHILS NFR BLD: 55.4 %
NITRITE UR QL STRIP: NEGATIVE
PH UR STRIP: 6 [PH] (ref 5–8)
PHOSPHATE SERPL-MCNC: 3.3 MG/DL
PLATELET # BLD AUTO: 148 K/UL
PMV BLD AUTO: 10.6 FL
POCT GLUCOSE: 100 MG/DL (ref 70–110)
POCT GLUCOSE: 108 MG/DL (ref 70–110)
POCT GLUCOSE: 126 MG/DL (ref 70–110)
POTASSIUM SERPL-SCNC: 3.8 MMOL/L
PROT UR QL STRIP: NEGATIVE
RBC # BLD AUTO: 4.1 M/UL
RBC #/AREA URNS AUTO: 2 /HPF (ref 0–4)
SODIUM SERPL-SCNC: 138 MMOL/L
SP GR UR STRIP: 1.01 (ref 1–1.03)
URN SPEC COLLECT METH UR: ABNORMAL
UROBILINOGEN UR STRIP-ACNC: NEGATIVE EU/DL
WBC # BLD AUTO: 6.92 K/UL
WBC #/AREA URNS AUTO: 0 /HPF (ref 0–5)

## 2017-04-06 PROCEDURE — 25000003 PHARM REV CODE 250: Performed by: NURSE PRACTITIONER

## 2017-04-06 PROCEDURE — 97530 THERAPEUTIC ACTIVITIES: CPT

## 2017-04-06 PROCEDURE — 36415 COLL VENOUS BLD VENIPUNCTURE: CPT

## 2017-04-06 PROCEDURE — 25000003 PHARM REV CODE 250: Performed by: INTERNAL MEDICINE

## 2017-04-06 PROCEDURE — 25000003 PHARM REV CODE 250: Performed by: SURGERY

## 2017-04-06 PROCEDURE — 83735 ASSAY OF MAGNESIUM: CPT

## 2017-04-06 PROCEDURE — 25000003 PHARM REV CODE 250: Performed by: STUDENT IN AN ORGANIZED HEALTH CARE EDUCATION/TRAINING PROGRAM

## 2017-04-06 PROCEDURE — 20600001 HC STEP DOWN PRIVATE ROOM

## 2017-04-06 PROCEDURE — 25000003 PHARM REV CODE 250: Performed by: GENERAL PRACTICE

## 2017-04-06 PROCEDURE — 80048 BASIC METABOLIC PNL TOTAL CA: CPT

## 2017-04-06 PROCEDURE — 63600175 PHARM REV CODE 636 W HCPCS: Performed by: NURSE PRACTITIONER

## 2017-04-06 PROCEDURE — 84100 ASSAY OF PHOSPHORUS: CPT

## 2017-04-06 PROCEDURE — 85025 COMPLETE CBC W/AUTO DIFF WBC: CPT

## 2017-04-06 PROCEDURE — 97116 GAIT TRAINING THERAPY: CPT

## 2017-04-06 RX ORDER — POTASSIUM CHLORIDE 20 MEQ/1
20 TABLET, EXTENDED RELEASE ORAL 2 TIMES DAILY
Status: DISCONTINUED | OUTPATIENT
Start: 2017-04-06 | End: 2017-04-07 | Stop reason: HOSPADM

## 2017-04-06 RX ORDER — TAMSULOSIN HYDROCHLORIDE 0.4 MG/1
0.4 CAPSULE ORAL DAILY
Status: DISCONTINUED | OUTPATIENT
Start: 2017-04-06 | End: 2017-04-06

## 2017-04-06 RX ORDER — POTASSIUM CHLORIDE 20 MEQ/1
20 TABLET, EXTENDED RELEASE ORAL 2 TIMES DAILY
Status: DISCONTINUED | OUTPATIENT
Start: 2017-04-06 | End: 2017-04-06

## 2017-04-06 RX ORDER — FUROSEMIDE 10 MG/ML
20 INJECTION INTRAMUSCULAR; INTRAVENOUS 2 TIMES DAILY
Status: DISCONTINUED | OUTPATIENT
Start: 2017-04-06 | End: 2017-04-06

## 2017-04-06 RX ORDER — FUROSEMIDE 10 MG/ML
20 INJECTION INTRAMUSCULAR; INTRAVENOUS 2 TIMES DAILY
Status: DISCONTINUED | OUTPATIENT
Start: 2017-04-06 | End: 2017-04-07 | Stop reason: HOSPADM

## 2017-04-06 RX ORDER — HYDROMORPHONE HYDROCHLORIDE 1 MG/ML
0.5 INJECTION, SOLUTION INTRAMUSCULAR; INTRAVENOUS; SUBCUTANEOUS ONCE
Status: COMPLETED | OUTPATIENT
Start: 2017-04-06 | End: 2017-04-06

## 2017-04-06 RX ADMIN — ASPIRIN 325 MG: 325 TABLET, DELAYED RELEASE ORAL at 09:04

## 2017-04-06 RX ADMIN — FUROSEMIDE 20 MG: 10 INJECTION, SOLUTION INTRAMUSCULAR; INTRAVENOUS at 09:04

## 2017-04-06 RX ADMIN — HYDROMORPHONE HYDROCHLORIDE 0.5 MG: 1 INJECTION, SOLUTION INTRAMUSCULAR; INTRAVENOUS; SUBCUTANEOUS at 09:04

## 2017-04-06 RX ADMIN — RAMELTEON 8 MG: 8 TABLET, FILM COATED ORAL at 09:04

## 2017-04-06 RX ADMIN — CLOPIDOGREL 75 MG: 75 TABLET, FILM COATED ORAL at 09:04

## 2017-04-06 RX ADMIN — METOPROLOL TARTRATE 50 MG: 50 TABLET, FILM COATED ORAL at 01:04

## 2017-04-06 RX ADMIN — CYCLOBENZAPRINE HYDROCHLORIDE 5 MG: 5 TABLET, FILM COATED ORAL at 05:04

## 2017-04-06 RX ADMIN — POTASSIUM CHLORIDE 20 MEQ: 1500 TABLET, EXTENDED RELEASE ORAL at 04:04

## 2017-04-06 RX ADMIN — TAMSULOSIN HYDROCHLORIDE 0.4 MG: 0.4 CAPSULE ORAL at 09:04

## 2017-04-06 RX ADMIN — METOPROLOL TARTRATE 50 MG: 50 TABLET, FILM COATED ORAL at 05:04

## 2017-04-06 RX ADMIN — DOCUSATE SODIUM 50 MG: 50 CAPSULE, LIQUID FILLED ORAL at 09:04

## 2017-04-06 RX ADMIN — FUROSEMIDE 20 MG: 10 INJECTION, SOLUTION INTRAMUSCULAR; INTRAVENOUS at 04:04

## 2017-04-06 RX ADMIN — Medication 3 ML: at 09:04

## 2017-04-06 RX ADMIN — POTASSIUM CHLORIDE 20 MEQ: 1500 TABLET, EXTENDED RELEASE ORAL at 09:04

## 2017-04-06 RX ADMIN — OXYCODONE HYDROCHLORIDE 15 MG: 5 TABLET ORAL at 12:04

## 2017-04-06 RX ADMIN — ALLOPURINOL 100 MG: 100 TABLET ORAL at 09:04

## 2017-04-06 RX ADMIN — ROSUVASTATIN CALCIUM 40 MG: 20 TABLET, FILM COATED ORAL at 09:04

## 2017-04-06 RX ADMIN — EZETIMIBE 10 MG: 10 TABLET ORAL at 09:04

## 2017-04-06 RX ADMIN — METOPROLOL TARTRATE 50 MG: 50 TABLET, FILM COATED ORAL at 09:04

## 2017-04-06 RX ADMIN — Medication 3 ML: at 01:04

## 2017-04-06 NOTE — PLAN OF CARE
Problem: Physical Therapy Goal  Goal: Physical Therapy Goal  Goals to be met by: 17     Patient will increase functional independence with mobility by performin. Supine to sit with [place] - met  2. Sit to stand transfer with [place]. - met  3. Gait x 350 feet with [place]. -met  4. Lower extremity exercise program x15 reps per handout, with assistance as needed. - met  5. [place] with adhering to sternal precautions with mobility. - met   All goals met.  Connie Perez, PTA

## 2017-04-06 NOTE — PLAN OF CARE
Problem: Patient Care Overview  Goal: Plan of Care Review  Outcome: Ongoing (interventions implemented as appropriate)  Plan of care discussed with patient. Patient ambulating independently, fall precautions in place. Continuing to encourage sternal precautions, IS, and ambulation. One time dose of 0.5mg dilaudid given in AM, not further complaints of pain. Chest tubes removed. Discussed medications and care. Patient has no questions at this time. Will continue to monitor.

## 2017-04-06 NOTE — PROGRESS NOTES
Progress Note  Cardiothoracic Surgery    Admit Date: 3/28/2017  Post-operative Day: 3 Days Post-Op  Hospital Day: 10    SUBJECTIVE: Pt has c/o left flank pain possibly related to previous kidney stone. Pt NSR on monitor.      Follow-up For: Procedure(s) (LRB):  AORTOCORONARY BYPASS-CABG/CABGX3 (N/A)    Scheduled Meds:   allopurinol  100 mg Oral Daily    aspirin  325 mg Oral Daily    clopidogrel  75 mg Oral Daily    ezetimibe  10 mg Oral Daily    metoprolol tartrate  50 mg Oral TID    rosuvastatin  40 mg Oral Daily    sodium chloride 0.9%  3 mL Intravenous Q8H    tamsulosin  0.4 mg Oral Daily     Infusions/Drips:   PRN Meds: acetaminophen, cyclobenzaprine, dextrose 50%, dextrose 50%, ondansetron, oxycodone, oxycodone, promethazine (PHENERGAN) IVPB, ramelteon    Review of patient's allergies indicates:   Allergen Reactions    Morpholine analogues Other (See Comments)       OBJECTIVE:     Vital Signs (Most Recent)  Temp: 99.3 °F (37.4 °C) (04/06/17 0740)  Pulse: 85 (04/06/17 0740)  Resp: 18 (04/06/17 0740)  BP: 114/73 (04/06/17 0740)  SpO2: (!) 94 % (04/06/17 0740)    Admission Weight: 104.3 kg (230 lb) (03/28/17 1816)   Most Recent Weight: 106.7 kg (235 lb 3.7 oz) (04/06/17 0700)    Vital Signs Range (Last 24H):  Temp:  [98.6 °F (37 °C)-100.8 °F (38.2 °C)]   Pulse:  []   Resp:  [16-18]   BP: (113-124)/(57-81)   SpO2:  [94 %-97 %]     I & O (Last 24H):    Intake/Output Summary (Last 24 hours) at 04/06/17 1043  Last data filed at 04/06/17 0700   Gross per 24 hour   Intake              855 ml   Output             1840 ml   Net             -985 ml     Physical Exam:  General: well developed, well nourished, no distress  Lungs:  clear to auscultation bilaterally and normal respiratory effort  Chest Wall: no tenderness  Heart: regular rate and rhythm, S1, S2 normal, no murmur, rub or gallop  Abdomen: soft nontender   Skin: Skin color, texture, turgor normal. No rashes or lesions  Neurologic: Alert and  oriented. Thought content appropriate    Wound/Incision:  clean, dry, intact    Laboratory:  CBC:     Recent Labs  Lab 04/06/17  0504   WBC 6.92   RBC 4.10*   HGB 12.4*   HCT 35.8*   *   MCV 87   MCH 30.2   MCHC 34.6     BMP:     Recent Labs  Lab 04/06/17  0504   GLU 97      K 3.8      CO2 26   BUN 15   CREATININE 0.9   CALCIUM 8.7   MG 1.8       Diagnostic Results:  X-Ray: Reviewed    ASSESSMENT/PLAN:     Assessment:  Active Hospital Problems    Diagnosis    *S/P CABG (coronary artery bypass graft)    Familial hypercholesterolemia     3/29/2017: Diagnosed. Chol 381. .      Family history of ischemic heart disease     Mother received coronary stent at age 38.      Nephrolithiasis     2016: Diagnosed.      Hyperuricemia     2016: Diagnosed.      Coronary artery disease     3/28/2017: NSTEMI. Troponin 0.2.   [place] [facility] [place] LAD: Prox 95%. Mid: Occlusion. Wrap around. LCX: OM1 90%. RCA: Distal 80% segmental. LV: Anteroseptal mild hypokinesia. EF 50%.      Non-ST elevation myocardial infarction (NSTEMI)     3/28/2017: NSTEMI. Troponin 0.2.        Plan:   Sternal Precautions  PT/OT  Ambulate  S/P CABG: continue asa,  Lopressor and plavix  Consulted Urology to evaluate kidney stone  Started on flomax and Ct ordered  D/c pleural chest tubes  Discharge planning ongoing

## 2017-04-06 NOTE — PT/OT/SLP PROGRESS
Physical Therapy  Treatment    Se Denson   MRN: [id number]   Admitting Diagnosis: S/P CABG (coronary artery bypass graft)    PT Received On: 17  PT Start Time: 915     PT Stop Time: 939    PT Total Time (min): 24 min       Billable Minutes:  Gait [id number] and Therapeutic Activity 10    Treatment Type: Treatment  PT/PTA: PTA     PTA Visit Number: 1       General Precautions: Standard, fall, sternal  Orthopedic Precautions: N/A     Do you have any cultural, spiritual, Hinduism conflicts, given your current situation?: none stated     Subjective:  Communicated with RN prior to session.  Pt agreeable to PT    Pain Ratin/10     Location - Orientation: generalized  Location:  (c/o kidney stones)  Pain Addressed: Pre-medicate for activity, Reposition, Distraction  Pain Rating Post-Intervention: 5/10    Objective:   Patient found with: telemetry, peripheral IV    Functional Mobility:  Bed Mobility:   Supine to Sit: Independent  Sit to Supine: Independent    Transfers:  Sit <> Stand Assistance: Independent  Sit <> Stand Assistive Device: No Assistive Device    Gait:   Gait Distance: 800 feet  Assistance 1: Independent  Gait Assistive Device: No device  Gait Pattern: 2-point gait  Gait Deviation(s):  (none noted)    Therapeutic Activities and Exercises:  Pt was given written sternal precautions and HEP  - which are to be performed BID.  Pt was also instructed in home walking program.  Pt was encouraged to walk as much as tolerated as often as possible.     AM-PAC 6 CLICK MOBILITY  How much help from another person does this patient currently need?   1 = Unable, Total/Dependent Assistance  2 = A lot, Maximum/Moderate Assistance  3 = A little, Minimum/Contact Guard/Supervision  4 = None, Modified [place]/Independent    Turning over in bed (including adjusting bedclothes, sheets and blankets)?: 4  Sitting down on and standing up from a chair with arms (e.g., wheelchair, bedside commode, etc.):  4  Moving from lying on back to sitting on the side of the bed?: 4  Moving to and from a bed to a chair (including a wheelchair)?: 4  Need to walk in hospital room?: 4  Climbing 3-5 steps with a railing?: 4  Total Score: 24    AM-PAC Raw Score CMS G-Code Modifier Level of Impairment Assistance   6 % Total / Unable   7 - 9 CM 80 - 100% Maximal Assist   10 - 14 CL 60 - 80% Moderate Assist   15 - 19 CK 40 - 60% Moderate Assist   20 - 22 CJ 20 - 40% Minimal Assist   23 CI 1-20% SBA / CGA   24 CH 0% Independent/ Mod I     Patient left supine with all lines intact, call button in reach and family member present.    Assessment:  Se Denson is a 46 y.o. male with a medical diagnosis of S/P CABG (coronary artery bypass graft) and presents with post op pain.    Rehab identified problem list/impairments: Rehab identified problem list/impairments: pain, impaired cardiopulmonary response to activity    Rehab potential is excellent.    Activity tolerance: Excellent    Discharge recommendations: Discharge Facility/Level Of Care Needs: home with home health     Barriers to discharge: Barriers to Discharge: None    Equipment recommendations: Equipment Needed After Discharge: none     GOALS:   Physical Therapy Goals        Problem: Physical Therapy Goal    Goal Priority Disciplines Outcome Goal Variances Interventions   Physical Therapy Goal     PT/OT, PT Ongoing (interventions implemented as appropriate)     Description:  Goals to be met by: 17     Patient will increase functional independence with mobility by performin. Supine to sit with [place] - met  2. Sit to stand transfer with [place]. - met  3. Gait  x 350 feet with [place]. -met  4. Lower extremity exercise program x15 reps per handout, with assistance as needed. - met  5. [place] with adhering to sternal precautions with mobility. - met                 PLAN:    Patient to be seen 4 x/week  to address the above listed  problems via gait training, therapeutic activities, therapeutic exercises  Plan of Care expires: 05/04/17  Plan of Care reviewed with: patient, family    Connie Perez, ANNABEL  04/06/2017

## 2017-04-07 VITALS
OXYGEN SATURATION: 97 % | HEART RATE: 86 BPM | RESPIRATION RATE: 18 BRPM | DIASTOLIC BLOOD PRESSURE: 63 MMHG | HEIGHT: 74 IN | TEMPERATURE: 98 F | SYSTOLIC BLOOD PRESSURE: 108 MMHG | WEIGHT: 223.13 LBS | BODY MASS INDEX: 28.64 KG/M2

## 2017-04-07 DIAGNOSIS — I25.10 CORONARY ARTERY DISEASE INVOLVING NATIVE CORONARY ARTERY OF NATIVE HEART WITHOUT ANGINA PECTORIS: Primary | ICD-10-CM

## 2017-04-07 LAB
ANION GAP SERPL CALC-SCNC: 11 MMOL/L
BASOPHILS # BLD AUTO: 0.03 K/UL
BASOPHILS NFR BLD: 0.4 %
BUN SERPL-MCNC: 16 MG/DL
CALCIUM SERPL-MCNC: 9.6 MG/DL
CHLORIDE SERPL-SCNC: 104 MMOL/L
CO2 SERPL-SCNC: 26 MMOL/L
CREAT SERPL-MCNC: 1 MG/DL
DIFFERENTIAL METHOD: ABNORMAL
EOSINOPHIL # BLD AUTO: 0.2 K/UL
EOSINOPHIL NFR BLD: 2 %
ERYTHROCYTE [DISTWIDTH] IN BLOOD BY AUTOMATED COUNT: 11.9 %
EST. GFR  (AFRICAN AMERICAN): >60 ML/MIN/1.73 M^2
EST. GFR  (NON AFRICAN AMERICAN): >60 ML/MIN/1.73 M^2
GLUCOSE SERPL-MCNC: 131 MG/DL
HCT VFR BLD AUTO: 37.1 %
HGB BLD-MCNC: 13 G/DL
LYMPHOCYTES # BLD AUTO: 1.3 K/UL
LYMPHOCYTES NFR BLD: 16.5 %
MAGNESIUM SERPL-MCNC: 1.8 MG/DL
MCH RBC QN AUTO: 30.2 PG
MCHC RBC AUTO-ENTMCNC: 35 %
MCV RBC AUTO: 86 FL
MONOCYTES # BLD AUTO: 1 K/UL
MONOCYTES NFR BLD: 12.4 %
NEUTROPHILS # BLD AUTO: 5.5 K/UL
NEUTROPHILS NFR BLD: 68.3 %
PHOSPHATE SERPL-MCNC: 3.6 MG/DL
PLATELET # BLD AUTO: 215 K/UL
PMV BLD AUTO: 10.3 FL
POTASSIUM SERPL-SCNC: 4.1 MMOL/L
RBC # BLD AUTO: 4.31 M/UL
SODIUM SERPL-SCNC: 141 MMOL/L
WBC # BLD AUTO: 8.06 K/UL

## 2017-04-07 PROCEDURE — 25000003 PHARM REV CODE 250: Performed by: GENERAL PRACTICE

## 2017-04-07 PROCEDURE — 25000003 PHARM REV CODE 250: Performed by: INTERNAL MEDICINE

## 2017-04-07 PROCEDURE — 25000003 PHARM REV CODE 250: Performed by: NURSE PRACTITIONER

## 2017-04-07 PROCEDURE — 85025 COMPLETE CBC W/AUTO DIFF WBC: CPT

## 2017-04-07 PROCEDURE — 83735 ASSAY OF MAGNESIUM: CPT

## 2017-04-07 PROCEDURE — 63600175 PHARM REV CODE 636 W HCPCS: Performed by: NURSE PRACTITIONER

## 2017-04-07 PROCEDURE — 84100 ASSAY OF PHOSPHORUS: CPT

## 2017-04-07 PROCEDURE — 25000003 PHARM REV CODE 250: Performed by: SURGERY

## 2017-04-07 PROCEDURE — 80048 BASIC METABOLIC PNL TOTAL CA: CPT

## 2017-04-07 PROCEDURE — 36415 COLL VENOUS BLD VENIPUNCTURE: CPT

## 2017-04-07 PROCEDURE — 25000003 PHARM REV CODE 250: Performed by: STUDENT IN AN ORGANIZED HEALTH CARE EDUCATION/TRAINING PROGRAM

## 2017-04-07 RX ORDER — OXYCODONE HYDROCHLORIDE 10 MG/1
10 TABLET ORAL EVERY 4 HOURS PRN
Qty: 60 TABLET | Refills: 0 | Status: SHIPPED | OUTPATIENT
Start: 2017-04-07

## 2017-04-07 RX ORDER — METOPROLOL TARTRATE 50 MG/1
50 TABLET ORAL 2 TIMES DAILY
Qty: 60 TABLET | Refills: 11 | Status: SHIPPED | OUTPATIENT
Start: 2017-04-07 | End: 2018-04-07

## 2017-04-07 RX ORDER — ALLOPURINOL 100 MG/1
100 TABLET ORAL DAILY
Qty: 60 TABLET | Refills: 11 | Status: SHIPPED | OUTPATIENT
Start: 2017-04-07

## 2017-04-07 RX ORDER — BISACODYL 10 MG
10 SUPPOSITORY, RECTAL RECTAL ONCE
Status: DISCONTINUED | OUTPATIENT
Start: 2017-04-07 | End: 2017-04-07 | Stop reason: HOSPADM

## 2017-04-07 RX ORDER — EZETIMIBE 10 MG/1
10 TABLET ORAL DAILY
Qty: 60 TABLET | Refills: 2 | Status: SHIPPED | OUTPATIENT
Start: 2017-04-07 | End: 2018-04-07

## 2017-04-07 RX ORDER — ASPIRIN 325 MG
325 TABLET, DELAYED RELEASE (ENTERIC COATED) ORAL DAILY
Refills: 0 | COMMUNITY
Start: 2017-04-07 | End: 2018-04-07

## 2017-04-07 RX ORDER — FUROSEMIDE 20 MG/1
20 TABLET ORAL 2 TIMES DAILY
Qty: 60 TABLET | Refills: 11 | Status: SHIPPED | OUTPATIENT
Start: 2017-04-07 | End: 2018-04-07

## 2017-04-07 RX ORDER — POTASSIUM CHLORIDE 20 MEQ/1
20 TABLET, EXTENDED RELEASE ORAL 2 TIMES DAILY
Qty: 60 TABLET | Refills: 11 | Status: SHIPPED | OUTPATIENT
Start: 2017-04-07

## 2017-04-07 RX ORDER — CLOPIDOGREL BISULFATE 75 MG/1
75 TABLET ORAL DAILY
Qty: 60 TABLET | Refills: 11 | Status: SHIPPED | OUTPATIENT
Start: 2017-04-07 | End: 2018-04-07

## 2017-04-07 RX ORDER — OXYCODONE HYDROCHLORIDE 10 MG/1
10 TABLET ORAL EVERY 4 HOURS PRN
Qty: 60 TABLET | Refills: 0 | Status: SHIPPED | OUTPATIENT
Start: 2017-04-07 | End: 2017-04-07

## 2017-04-07 RX ADMIN — METOPROLOL TARTRATE 50 MG: 50 TABLET, FILM COATED ORAL at 05:04

## 2017-04-07 RX ADMIN — POTASSIUM CHLORIDE 20 MEQ: 1500 TABLET, EXTENDED RELEASE ORAL at 08:04

## 2017-04-07 RX ADMIN — DOCUSATE SODIUM 50 MG: 50 CAPSULE, LIQUID FILLED ORAL at 05:04

## 2017-04-07 RX ADMIN — ROSUVASTATIN CALCIUM 40 MG: 20 TABLET, FILM COATED ORAL at 08:04

## 2017-04-07 RX ADMIN — CLOPIDOGREL 75 MG: 75 TABLET, FILM COATED ORAL at 08:04

## 2017-04-07 RX ADMIN — EZETIMIBE 10 MG: 10 TABLET ORAL at 08:04

## 2017-04-07 RX ADMIN — FUROSEMIDE 20 MG: 10 INJECTION, SOLUTION INTRAMUSCULAR; INTRAVENOUS at 08:04

## 2017-04-07 RX ADMIN — ASPIRIN 325 MG: 325 TABLET, DELAYED RELEASE ORAL at 08:04

## 2017-04-07 RX ADMIN — Medication 3 ML: at 05:04

## 2017-04-07 RX ADMIN — ALLOPURINOL 100 MG: 100 TABLET ORAL at 08:04

## 2017-04-07 NOTE — DISCHARGE SUMMARY
[facility]  Discharge Summary  Cardiothoracic Surgery      Admit Date: 3/28/2017    Discharge Date and Time:  04/07/2017 8:11 AM    Attending Physician: Enrrique Allen MD     Discharge Physician: Mary Walker    Reason for Admission: CABG     Procedures Performed: Coronary artery bypass grafting x2, right internal mammary artery to obtuse marginal and left internal mammary artery to LAD.    HPI:This is a 46-year-old gentleman visiting [place].   He was here for a conference. He was admitted to [facility] with   non-STEMI. He underwent cath by Dr. Francisco, demonstrated severe three-vessel   disease with a totally occluded LAD that filled by collaterals. He was   transferred in stable condition and remained chest pain free. He was taken to   the Operating Room as an inpatient and able to give informed consent for the   procedure.    HOSPITAL COURSE:  As described above, the patient underwent the above stated procedure. Please see the previously dictated operative report for complete details. Postoperatively, the patient was taken to the ICU where his vital signs where monitored and pain was kept under control. He was weaned from drips and extubated per the ICU per protocol. When it was felt he was hemodynamically stable, he was transferred to the Cardiac Step down unit for continued strengthening and ambulation. His chest tubes were removed, at which time they had drained minimal amount. Pacer wires also removed before discharge. Remainder of his hospital course was uncomplicated. He remained in normal sinus rhythm. On postoperative day #4 the patient was desirous of discharge to home. At the time of discharge, he was ambulating unassisted in the hallway; his pain was well controlled with oral analgesics and was tolerating diet without nausea or vomiting.     MOBILITY AND ACTIVITY: As tolerated. Patient my shower. No heavy lifting of greater than 5 pounds. No driving.     DIET:  Cardiac diet with a 1500 ml fluid restriction.    WOUND CARE INSTRUCTIONS: Check for redness, swelling, and drainage around the incision or wound. Patient is to call for any obvious bleeding, drainage, and pus from the wound, unusual problems or difficulties or temperature of greater than 101 degrees.     Final Diagnoses:   Principal Problem: S/P CABG (coronary artery bypass graft)   Secondary Diagnoses:   Active Hospital Problems    Diagnosis  POA    *S/P CABG (coronary artery bypass graft) [Z95.1]  Not Applicable    Familial hypercholesterolemia [E78.01]  Yes     3/29/2017: Diagnosed. Chol 381. .      Family history of ischemic heart disease [Z82.49]  Not Applicable     Mother received coronary stent at age 38.      Nephrolithiasis [N20.0]  Yes     2016: Diagnosed.      Hyperuricemia [E79.0]  Yes     2016: Diagnosed.      Coronary artery disease [I25.10]  Yes     3/28/2017: NSTEMI. Troponin 0.2.   3/29/2017: [facility]: Cath: LAD: Prox 95%. Mid: Occlusion. Wrap around. LCX: OM1 90%. RCA: Distal 80% segmental. LV: Anteroseptal mild hypokinesia. EF 50%.      Non-ST elevation myocardial infarction (NSTEMI) [I21.4]  Yes     3/28/2017: NSTEMI. Troponin 0.2.         Resolved Hospital Problems    Diagnosis Date Resolved POA   No resolved problems to display.       Discharged Condition: stable    Disposition: Home or Self Care    Sternal Precautions? Yes    Lift Restrictions? Yes    Follow Up/Patient Instructions: Follow up on Wednesday with Dr. Allen. Prior to appointment, patient will have a chest xray and EKG.     Medications:   Reconciled Home Medications:   Current Discharge Medication List      START taking these medications    Details   allopurinol (ZYLOPRIM) 100 MG tablet Take 1 tablet (100 mg total) by mouth once daily.  Qty: 60 tablet, Refills: 11      aspirin (ECOTRIN) 325 MG EC tablet Take 1 tablet (325 mg total) by mouth once daily.  Refills: 0      clopidogrel (PLAVIX) 75 mg tablet Take 1 tablet (75  mg total) by mouth once daily.  Qty: 60 tablet, Refills: 11      docusate sodium (COLACE) 50 MG capsule Take 1 capsule (50 mg total) by mouth 2 (two) times daily as needed for Constipation.  Refills: 0      ezetimibe (ZETIA) 10 mg tablet Take 1 tablet (10 mg total) by mouth once daily.  Qty: 60 tablet, Refills: 2      furosemide (LASIX) 20 MG tablet Take 1 tablet (20 mg total) by mouth 2 (two) times daily.  Qty: 60 tablet, Refills: 11    Comments: Take one tablet by mouth twice a day for one week then decrease to one tablet daily      metoprolol tartrate (LOPRESSOR) 50 MG tablet Take 1 tablet (50 mg total) by mouth 2 (two) times daily.  Qty: 60 tablet, Refills: 11      oxycodone (ROXICODONE) 10 mg Tab immediate release tablet Take 1 tablet (10 mg total) by mouth every 4 (four) hours as needed.  Qty: 60 tablet, Refills: 0      potassium chloride SA (K-DUR,KLOR-CON) 20 MEQ tablet Take 1 tablet (20 mEq total) by mouth 2 (two) times daily.  Qty: 60 tablet, Refills: 11    Comments: Take one tablet by mouth twice a day for one week then decrease to one tablet daily         CONTINUE these medications which have NOT CHANGED    Details   rosuvastatin (CRESTOR) 40 MG Tab Take 40 mg by mouth once daily.               At the time of discharge patient's vital signs were stable and afebrile. Pt was in a NSR. Pt was not placed on ACE at the time of discharge due to potential hypotension

## 2017-04-07 NOTE — PLAN OF CARE
Problem: Patient Care Overview  Goal: Plan of Care Review  Outcome: Ongoing (interventions implemented as appropriate)  No acute events throughout night, pt reported he has no had a BM since surgery, colace ordered PRN.  VS and assessment per flow sheet, patient progressing towards goals as tolerated, plan of care reviewed with Se Denson and family, all concerns addressed, will continue to monitor.

## 2017-04-07 NOTE — PT/OT/SLP DISCHARGE
Physical Therapy Discharge Summary    Se Denson  MRN: [id number]   S/P CABG (coronary artery bypass graft)   Patient Discharged from acute Physical Therapy on 17.  Please refer to prior PT noted date on 17 for functional status.     Assessment:   Goals partially met.  GOALS:   Physical Therapy Goals        Problem: Physical Therapy Goal    Goal Priority Disciplines Outcome Goal Variances Interventions   Physical Therapy Goal     PT/OT, PT Ongoing (interventions implemented as appropriate)     Description:  Goals to be met by: 17     Patient will increase functional independence with mobility by performin. Supine to sit with [place] - met  2. Sit to stand transfer with [place]. - met  3. Gait  x 350 feet with [place]. -met  4. Lower extremity exercise program x15 reps per handout, with assistance as needed. - met  5. [place] with adhering to sternal precautions with mobility. - met               Reasons for Discontinuation of Therapy Services  Satisfactory goal achievement.      Plan:  Patient Discharged to: Home no PT services needed.

## 2017-04-07 NOTE — PLAN OF CARE
Problem: Patient Care Overview  Goal: Plan of Care Review  Outcome: Revised  Patient is ready for discharge. Treatment plan implemened. VSS. Patient stable alert and oriented. IVs removed. No complaints of pain. Discussed discharge plan. Reviewed medications and side effects, appointments, and answered questions with patient and family. Pain prescription RX given to patient.  pT refused transport

## 2017-04-11 ENCOUNTER — PATIENT OUTREACH (OUTPATIENT)
Dept: ADMINISTRATIVE | Facility: CLINIC | Age: 47
End: 2017-04-11
Payer: COMMERCIAL

## 2017-04-11 NOTE — PATIENT INSTRUCTIONS
Discharge Instructions for Heart Attack  You have had a heart attack (acute myocardial infarction). A heart attack occurs when a vessel that sends blood to your heart suddenly becomes blocked. This causes your heart not to work as well as it should. Follow these guidelines for home care and lifestyle changes.  Home care  · Take your medicines exactly as directed. Dont skip doses. Talk with your healthcare provider if your medicines aren't working for you. Together you can come up with another treatment plan.  · Remember that recovery after a heart attack takes time. Plan to rest for at least 4 to 8 weeks while you recover. Then return to normal activity when your doctor says its OK.  · Ask your doctor about joining a heart rehabilitation program. This can help strengthen your heart and lungs and give you more energy and confidence.  · Tell your doctor if you are feeling depressed. Feelings of sadness are common after a heart attack. But it is important to speak to someone or seek counseling if you are feeling overwhelmed by these feelings.  · Call 911 right away if you have chest pain or pain that goes to your shoulder, neck, or back. Don't drive yourself to the hospital.  · Ask your family members to learn CPR. This is an important skill that can save lives when it's needed.  · Learn to take your own blood pressure and pulse. Keep a record of your results. Ask your doctor when you should seek emergency medical attention. He or she will tell you which blood pressure reading is dangerous.  Lifestyle changes  Your heart attack might have been caused by cardiovascular disease. Your healthcare provider will work with you to make changes to your lifestyle. This will help the heart disease from getting worse. These changes will most likely be a combination of diet and exercise.  Diet  Your healthcare provider will tell you what changes you need to make to your diet. You may need to see a registered dietitian for help  with these diet changes. These changes may include:  · Cutting back on how much fat and cholesterol you eat  · Cutting back on how much salt (sodium) you eat, especially if you have high blood pressure  · Eating more fresh vegetables and fruits  · Eating lean proteins such as fish, poultry, beans, and peas, and eating less red meat and processed meats  · Using low-fat dairy products  · Using vegetable and nut oils in limited amounts  · Limiting how many sweets and processed foods such as chips, cookies, and baked goods you eat  · Limiting how often you eat out. And when you do eat out, making better food choices.  · Not eating fried or greasy foods, or foods high in saturated fat  Exercise  Your healthcare provider may tell you to get more exercise if you haven't been physically active. Depending on your case, your provider may recommend that you get moderate to vigorous physical activity for at least 40 minutes each day, and for at least 3 to 4 days each week. A few examples of moderate to vigorous activity include:  · Walking at a brisk pace, about 3 to 4 miles per hour  · Jogging or running  · Swimming or water aerobics  · Hiking  · Dancing  · Martial arts  · Tennis  · Riding a bicycle or stationary bike  Other changes  Your healthcare provider may also recommend that you:  · Lose weight. If you are overweight or obese, your provider will work with you to lose extra pounds. Making diet changes and getting more exercise can help. A good goal is to lose your 10% of your body weight in one year.  · Stop smoking. Sign up for a stop-smoking program to make it more likely for you to quit for good. You can join a stop-smoking support group. Or ask your doctor about nicotine replacement products.  · Learn to manage stress. Stress management techniques to help you deal with stress in your home and work life. This will help you feel better emotionally and ease the strain on your heart.  Follow-up  Make a follow-up  appointment as directed by our staff.     When to seek medical advice  Call 911 right away if you have:  · Chest pain that goes to your neck, jaw, back, or shoulder  · Shortness of breath  Otherwise, call your doctor immediately if you have:  · Lightheadedness, dizziness, or fainting  · Feeling of irregular heartbeat or fast pulse.   Date Last Reviewed: 10/1/2016  © 0620-5995 [employer]. [address] [place] [address], [place] [address]. All rights reserved. This information is not intended as a substitute for professional medical care. Always follow your healthcare professional's instructions.

## 2017-04-12 ENCOUNTER — HOSPITAL ENCOUNTER (OUTPATIENT)
Dept: RADIOLOGY | Facility: HOSPITAL | Age: 47
Discharge: HOME | End: 2017-04-12
Attending: THORACIC SURGERY (CARDIOTHORACIC VASCULAR SURGERY)
Payer: COMMERCIAL

## 2017-04-12 ENCOUNTER — HOSPITAL ENCOUNTER (OUTPATIENT)
Dept: CARDIOLOGY | Facility: CLINIC | Age: 47
Discharge: HOME | End: 2017-04-12
Payer: COMMERCIAL

## 2017-04-12 ENCOUNTER — OFFICE VISIT (OUTPATIENT)
Dept: CARDIOTHORACIC SURGERY | Facility: CLINIC | Age: 47
End: 2017-04-12
Payer: COMMERCIAL

## 2017-04-12 VITALS
BODY MASS INDEX: 29.51 KG/M2 | SYSTOLIC BLOOD PRESSURE: 122 MMHG | DIASTOLIC BLOOD PRESSURE: 69 MMHG | TEMPERATURE: 98 F | WEIGHT: 222.69 LBS | OXYGEN SATURATION: 97 % | HEART RATE: 95 BPM | HEIGHT: 73 IN

## 2017-04-12 DIAGNOSIS — I25.10 CORONARY ARTERY DISEASE INVOLVING NATIVE CORONARY ARTERY OF NATIVE HEART WITHOUT ANGINA PECTORIS: ICD-10-CM

## 2017-04-12 DIAGNOSIS — Z95.1 S/P CABG (CORONARY ARTERY BYPASS GRAFT): Primary | ICD-10-CM

## 2017-04-12 PROCEDURE — 99024 POSTOP FOLLOW-UP VISIT: CPT | Mod: S$GLB,,, | Performed by: THORACIC SURGERY (CARDIOTHORACIC VASCULAR SURGERY)

## 2017-04-12 PROCEDURE — 93000 ELECTROCARDIOGRAM COMPLETE: CPT | Mod: S$GLB,,, | Performed by: INTERNAL MEDICINE

## 2017-04-12 PROCEDURE — 99999 PR PBB SHADOW E&M-EST. PATIENT-LVL III: CPT | Mod: PBBFAC,,, | Performed by: THORACIC SURGERY (CARDIOTHORACIC VASCULAR SURGERY)

## 2017-04-12 PROCEDURE — 71020 XR CHEST PA AND LATERAL: CPT | Mod: 26,,, | Performed by: RADIOLOGY

## 2017-04-12 RX ORDER — CYCLOBENZAPRINE HCL 5 MG
5 TABLET ORAL 3 TIMES DAILY PRN
Qty: 30 TABLET | Refills: 0 | Status: SHIPPED | OUTPATIENT
Start: 2017-04-12 | End: 2017-04-22

## 2017-04-12 NOTE — MR AVS SNAPSHOT
[facility]  [address]  [facility] [address]  Phone: [phone]                  Se Denson   2017 10:45 AM   Office Visit    Description:  Male : 1970   Provider:  Enrrique Allen MD   Department:  [facility]           Reason for Visit     Post-op Evaluation           Diagnoses this Visit        Comments    S/P CABG (coronary artery bypass graft)    -  Primary            To Do List           Goals (5 Years of Data)     None      [facility]     [facility] -  Assistance  Unless otherwise directed by your provider, please contact [facility], our nurse care line that is available for  assistance.     Registered nurses in the [facility] provide: appointment scheduling, clinical advisement, health education, and other advisory services.  Call: [phone] (toll free)               Medications           Message regarding Medications     Verify the changes and/or additions to your medication regime listed below are the same as discussed with your clinician today.  If any of these changes or additions are incorrect, please notify your healthcare provider.             Verify that the below list of medications is an accurate representation of the medications you are currently taking.  If none reported, the list may be blank. If incorrect, please contact your healthcare provider. Carry this list with you in case of emergency.           Current Medications     allopurinol (ZYLOPRIM) 100 MG tablet Take 1 tablet (100 mg total) by mouth once daily.    aspirin (ECOTRIN) 325 MG EC tablet Take 1 tablet (325 mg total) by mouth once daily.    clopidogrel (PLAVIX) 75 mg tablet Take 1 tablet (75 mg total) by mouth once daily.    cyclobenzaprine (FLEXERIL) 5 MG tablet Take 1 tablet (5 mg total) by mouth 3 (three) times daily as needed for Muscle spasms.    docusate sodium (COLACE) 50 MG capsule Take 1  "capsule (50 mg total) by mouth 2 (two) times daily as needed for Constipation.    ezetimibe (ZETIA) 10 mg tablet Take 1 tablet (10 mg total) by mouth once daily.    furosemide (LASIX) 20 MG tablet Take 1 tablet (20 mg total) by mouth 2 (two) times daily.    metoprolol tartrate (LOPRESSOR) 50 MG tablet Take 1 tablet (50 mg total) by mouth 2 (two) times daily.    oxycodone (ROXICODONE) 10 mg Tab immediate release tablet Take 1 tablet (10 mg total) by mouth every 4 (four) hours as needed.    potassium chloride SA (K-DUR,KLOR-CON) 20 MEQ tablet Take 1 tablet (20 mEq total) by mouth 2 (two) times daily.    rosuvastatin (CRESTOR) 40 MG Tab Take 40 mg by mouth once daily.           Clinical Reference Information           Your Vitals Were     BP Pulse Temp Height Weight SpO2    122/69 95 98.4 °F (36.9 °C) (Oral) 6' 1" (1.854 m) 101 kg (222 lb 11.2 oz) 97%    BMI                29.38 kg/m2          Blood Pressure          Most Recent Value    Right Arm BP - Sitting  122/69    BP  122/69      Allergies as of 4/12/2017     Morpholine Analogues      Immunizations Administered on Date of Encounter - 4/12/2017     None      [facility] Sign-Up     Activating your [facility] account is as easy as 1-2-3!     1) Visit [email or web address], select Sign Up Now, enter this activation code and your date of birth, then select Next.  [email or web address]  Expires: 5/20/2017  1:27 PM      2) Create a username and password to use when you visit [facility] in the future and select a security question in case you lose your password and select Next.    3) Enter your e-mail address and click Sign Up!    Additional Information  If you have questions, please e-mail [email or web address] or call [phone] to talk to our [facility] staff. Remember, [facility] is NOT to be used for urgent needs. For medical emergencies, dial 911.         Smoking Cessation     If you would like to quit smoking:   You may be eligible for free services if you are a [place] " resident and started smoking cigarettes before September 1, 1988.  Call the Smoking Cessation Trust (SCT) toll free at (586) 141-9079 or (081) 982-8896.   Call 1-800-QUIT-NOW if you do not meet the above criteria.   Contact us via email: [email or web address]   View our website for more information: [email or web address]        Language Assistance Services     ATTENTION: Language assistance services are available, free of charge. Please call [phone].      ATENCIÓN: Si habla español, tiene a black disposición servicios gratuitos de asistencia lingüística. Llame al [phone].     CHÚ Ý: N?u b?n nói Ti?ng Vi?t, có các d?ch v? h? tr? ngôn ng? mi?n phí dành cho b?n. G?i s? [phone].         [facility] complies with applicable Federal civil rights laws and does not discriminate on the basis of race, color, national origin, age, disability, or sex.

## 2017-04-12 NOTE — PROGRESS NOTES
Patient here for CABG follow up.  He is from [place], [place] and is POD #10 and wishes to travel back home    Sternum: stable  Chest xray: satisfactory post op chest    Assessment;  S/p CABG    Plan:  Will plan follow up with a cardiologist in [place]  Will provide Op note to patient to take back with him.    No scheduled appt.  RTC prn

## 2017-05-03 ENCOUNTER — RESEARCH ENCOUNTER (OUTPATIENT)
Dept: RESEARCH | Facility: HOSPITAL | Age: 47
End: 2017-05-03

## 2017-05-03 NOTE — PROGRESS NOTES
Telephone call to Mr. Denson today - he is doing very well and saw his cardiologist 2 weeks ago.  He has also started Cardiac Rehab this week.  No questions or concerns at this time. Thanked Dr. Allen and the team for the care he received.

## 2020-03-02 ENCOUNTER — APPOINTMENT (RX ONLY)
Dept: URBAN - METROPOLITAN AREA CLINIC 141 | Facility: CLINIC | Age: 50
Setting detail: DERMATOLOGY
End: 2020-03-02

## 2020-03-02 DIAGNOSIS — D18.0 HEMANGIOMA: ICD-10-CM

## 2020-03-02 DIAGNOSIS — L81.4 OTHER MELANIN HYPERPIGMENTATION: ICD-10-CM

## 2020-03-02 DIAGNOSIS — L91.8 OTHER HYPERTROPHIC DISORDERS OF THE SKIN: ICD-10-CM

## 2020-03-02 DIAGNOSIS — D22 MELANOCYTIC NEVI: ICD-10-CM

## 2020-03-02 DIAGNOSIS — L57.8 OTHER SKIN CHANGES DUE TO CHRONIC EXPOSURE TO NONIONIZING RADIATION: ICD-10-CM

## 2020-03-02 DIAGNOSIS — L82.1 OTHER SEBORRHEIC KERATOSIS: ICD-10-CM

## 2020-03-02 PROBLEM — D22.5 MELANOCYTIC NEVI OF TRUNK: Status: ACTIVE | Noted: 2020-03-02

## 2020-03-02 PROBLEM — D48.5 NEOPLASM OF UNCERTAIN BEHAVIOR OF SKIN: Status: ACTIVE | Noted: 2020-03-02

## 2020-03-02 PROBLEM — D18.01 HEMANGIOMA OF SKIN AND SUBCUTANEOUS TISSUE: Status: ACTIVE | Noted: 2020-03-02

## 2020-03-02 PROCEDURE — 11102 TANGNTL BX SKIN SINGLE LES: CPT

## 2020-03-02 PROCEDURE — ? COUNSELING

## 2020-03-02 PROCEDURE — ? BIOPSY BY SHAVE METHOD

## 2020-03-02 PROCEDURE — 99203 OFFICE O/P NEW LOW 30 MIN: CPT | Mod: 25

## 2020-03-02 ASSESSMENT — LOCATION ZONE DERM
LOCATION ZONE: TRUNK
LOCATION ZONE: LEG
LOCATION ZONE: NECK

## 2020-03-02 ASSESSMENT — LOCATION SIMPLE DESCRIPTION DERM
LOCATION SIMPLE: LEFT UPPER BACK
LOCATION SIMPLE: RIGHT LOWER BACK
LOCATION SIMPLE: TRAPEZIAL NECK
LOCATION SIMPLE: RIGHT THIGH

## 2020-03-02 ASSESSMENT — LOCATION DETAILED DESCRIPTION DERM
LOCATION DETAILED: LEFT SUPERIOR MEDIAL UPPER BACK
LOCATION DETAILED: RIGHT SUPERIOR MEDIAL LOWER BACK
LOCATION DETAILED: RIGHT MEDIAL POSTERIOR THIGH
LOCATION DETAILED: MID TRAPEZIAL NECK
LOCATION DETAILED: LEFT INFERIOR UPPER BACK

## 2020-03-02 NOTE — PROCEDURE: BIOPSY BY SHAVE METHOD
Destruction After The Procedure: No
Depth Of Biopsy: dermis
Electrodesiccation Text: The wound bed was treated with electrodesiccation after the biopsy was performed.
Dressing: bandage
Anesthesia Volume In Cc: 0.5
X Size Of Lesion In Cm: 0
Post-Care Instructions: I reviewed with the patient in detail post-care instructions. Patient is to keep the biopsy site dry overnight, and then apply Vaseline twice daily until healed. Patient may apply hydrogen peroxide soaks to remove any crusting.
Wound Care: Vaseline
Size Of Lesion In Cm: 1
Cryotherapy Text: The wound bed was treated with cryotherapy after the biopsy was performed.
Anesthesia Type: 1% lidocaine with epinephrine
Silver Nitrate Text: The wound bed was treated with silver nitrate after the biopsy was performed.
Biopsy Type: H and E
Detail Level: Detailed
Electrodesiccation And Curettage Text: The wound bed was treated with electrodesiccation and curettage after the biopsy was performed.
Biopsy Method: Dermablade
Type Of Destruction Used: Cryotherapy
Notification Instructions: Patient will be notified of biopsy results. However, patient instructed to call the office if not contacted within 2 weeks.
Hemostasis: Electrocautery and Aluminum Chloride
Consent: Written consent was obtained and risks were reviewed including but not limited to scarring, infection, bleeding, scabbing, incomplete removal, nerve damage and allergy to anesthesia.
Curettage Text: The wound bed was treated with curettage after the biopsy was performed.
Billing Type: Third-Party Bill
Was A Bandage Applied: Yes

## 2020-03-02 NOTE — PROCEDURE: MIPS QUALITY
Quality 431: Preventive Care And Screening: Unhealthy Alcohol Use - Screening: Patient screened for unhealthy alcohol use using a single question and scores less than 2 times per year
Detail Level: Detailed
Quality 155 (Denominator): Falls Plan Of Care: Plan of Care not Documented, Reason not Otherwise Specified
Quality 154 Part A: Falls: Risk Assessment (Should Be Reported With Measure 155.): Falls risk assessment completed and documented in the past 12 months.
Quality 110: Preventive Care And Screening: Influenza Immunization: Influenza Immunization previously received during influenza season
Quality 111:Pneumonia Vaccination Status For Older Adults: Pneumococcal Vaccination not Administered or Previously Received, Reason not Otherwise Specified
Quality 154 Part B: Falls: Risk Screening (Should Be Reported With Measure 155.): Patient screened for future fall risk; documentation of no falls in the past year or only one fall without injury in the past year
Quality 226: Preventive Care And Screening: Tobacco Use: Screening And Cessation Intervention: Patient screened for tobacco use and is an ex/non-smoker
Quality 402: Tobacco Use And Help With Quitting Among Adolescents: Patient screened for tobacco and never smoked
Quality 131: Pain Assessment And Follow-Up: Pain assessment using a standardized tool is documented as negative, no follow-up plan required

## 2020-04-16 ENCOUNTER — APPOINTMENT (RX ONLY)
Dept: URBAN - METROPOLITAN AREA CLINIC 141 | Facility: CLINIC | Age: 50
Setting detail: DERMATOLOGY
End: 2020-04-16

## 2020-04-16 DIAGNOSIS — L92.0 GRANULOMA ANNULARE: ICD-10-CM

## 2020-04-16 PROBLEM — L30.9 DERMATITIS, UNSPECIFIED: Status: ACTIVE | Noted: 2020-04-16

## 2020-04-16 PROCEDURE — ? PRESCRIPTION

## 2020-04-16 PROCEDURE — 99212 OFFICE O/P EST SF 10 MIN: CPT

## 2020-04-16 PROCEDURE — ? TREATMENT REGIMEN

## 2020-04-16 PROCEDURE — ? COUNSELING

## 2020-04-16 RX ORDER — CLOBETASOL PROPIONATE 0.5 MG/G
CREAM TOPICAL
Qty: 1 | Refills: 1 | Status: ERX | COMMUNITY
Start: 2020-04-16

## 2020-04-16 RX ADMIN — CLOBETASOL PROPIONATE: 0.5 CREAM TOPICAL at 00:00

## 2020-04-16 ASSESSMENT — LOCATION ZONE DERM: LOCATION ZONE: LEG

## 2020-04-16 ASSESSMENT — LOCATION SIMPLE DESCRIPTION DERM: LOCATION SIMPLE: RIGHT KNEE

## 2020-04-16 ASSESSMENT — LOCATION DETAILED DESCRIPTION DERM: LOCATION DETAILED: RIGHT KNEE

## 2020-04-16 NOTE — PROCEDURE: MIPS QUALITY
Quality 154 Part B: Falls: Risk Screening (Should Be Reported With Measure 155.): Patient screened for future fall risk; documentation of no falls in the past year or only one fall without injury in the past year
Quality 111:Pneumonia Vaccination Status For Older Adults: Pneumococcal Vaccination not Administered or Previously Received, Reason not Otherwise Specified
Detail Level: Detailed
Quality 155 (Denominator): Falls Plan Of Care: Plan of Care not Documented, Reason not Otherwise Specified
Quality 110: Preventive Care And Screening: Influenza Immunization: Influenza Immunization previously received during influenza season
Quality 226: Preventive Care And Screening: Tobacco Use: Screening And Cessation Intervention: Patient screened for tobacco use and is an ex/non-smoker
Quality 154 Part A: Falls: Risk Assessment (Should Be Reported With Measure 155.): Falls risk assessment completed and documented in the past 12 months.
Quality 431: Preventive Care And Screening: Unhealthy Alcohol Use - Screening: Patient screened for unhealthy alcohol use using a single question and scores less than 2 times per year
Quality 131: Pain Assessment And Follow-Up: Pain assessment using a standardized tool is documented as negative, no follow-up plan required
Quality 402: Tobacco Use And Help With Quitting Among Adolescents: Patient screened for tobacco and never smoked

## 2022-02-09 ENCOUNTER — APPOINTMENT (RX ONLY)
Dept: URBAN - METROPOLITAN AREA CLINIC 141 | Facility: CLINIC | Age: 52
Setting detail: DERMATOLOGY
End: 2022-02-09

## 2022-02-09 DIAGNOSIS — L82.1 OTHER SEBORRHEIC KERATOSIS: ICD-10-CM

## 2022-02-09 DIAGNOSIS — L57.8 OTHER SKIN CHANGES DUE TO CHRONIC EXPOSURE TO NONIONIZING RADIATION: ICD-10-CM | Status: INADEQUATELY CONTROLLED

## 2022-02-09 DIAGNOSIS — D18.0 HEMANGIOMA: ICD-10-CM

## 2022-02-09 DIAGNOSIS — L81.4 OTHER MELANIN HYPERPIGMENTATION: ICD-10-CM

## 2022-02-09 DIAGNOSIS — Z71.89 OTHER SPECIFIED COUNSELING: ICD-10-CM

## 2022-02-09 DIAGNOSIS — L85.3 XEROSIS CUTIS: ICD-10-CM | Status: INADEQUATELY CONTROLLED

## 2022-02-09 DIAGNOSIS — L91.8 OTHER HYPERTROPHIC DISORDERS OF THE SKIN: ICD-10-CM

## 2022-02-09 DIAGNOSIS — D22 MELANOCYTIC NEVI: ICD-10-CM

## 2022-02-09 PROBLEM — D22.61 MELANOCYTIC NEVI OF RIGHT UPPER LIMB, INCLUDING SHOULDER: Status: ACTIVE | Noted: 2022-02-09

## 2022-02-09 PROBLEM — D18.01 HEMANGIOMA OF SKIN AND SUBCUTANEOUS TISSUE: Status: ACTIVE | Noted: 2022-02-09

## 2022-02-09 PROCEDURE — ? TREATMENT REGIMEN

## 2022-02-09 PROCEDURE — ? BENIGN DESTRUCTION

## 2022-02-09 PROCEDURE — 99213 OFFICE O/P EST LOW 20 MIN: CPT

## 2022-02-09 PROCEDURE — ? COUNSELING

## 2022-02-09 ASSESSMENT — LOCATION SIMPLE DESCRIPTION DERM
LOCATION SIMPLE: LEFT ANTERIOR NECK
LOCATION SIMPLE: RIGHT UPPER BACK
LOCATION SIMPLE: RIGHT PRETIBIAL REGION
LOCATION SIMPLE: UPPER BACK
LOCATION SIMPLE: CHEST
LOCATION SIMPLE: LEFT PRETIBIAL REGION
LOCATION SIMPLE: INFERIOR FOREHEAD
LOCATION SIMPLE: RIGHT CALF
LOCATION SIMPLE: LEFT CALF
LOCATION SIMPLE: RIGHT FOREARM
LOCATION SIMPLE: LEFT FOREARM
LOCATION SIMPLE: RIGHT SHOULDER

## 2022-02-09 ASSESSMENT — LOCATION DETAILED DESCRIPTION DERM
LOCATION DETAILED: LEFT CLAVICULAR NECK
LOCATION DETAILED: LEFT DISTAL CALF
LOCATION DETAILED: LEFT VENTRAL PROXIMAL FOREARM
LOCATION DETAILED: INFERIOR MID FOREHEAD
LOCATION DETAILED: RIGHT INFERIOR LATERAL UPPER BACK
LOCATION DETAILED: RIGHT DISTAL CALF
LOCATION DETAILED: LEFT PROXIMAL PRETIBIAL REGION
LOCATION DETAILED: SUPERIOR THORACIC SPINE
LOCATION DETAILED: RIGHT POSTERIOR SHOULDER
LOCATION DETAILED: STERNUM
LOCATION DETAILED: RIGHT MID-UPPER BACK
LOCATION DETAILED: RIGHT LATERAL UPPER BACK
LOCATION DETAILED: RIGHT VENTRAL PROXIMAL FOREARM
LOCATION DETAILED: RIGHT DISTAL PRETIBIAL REGION
LOCATION DETAILED: RIGHT PROXIMAL DORSAL FOREARM
LOCATION DETAILED: LEFT PROXIMAL DORSAL FOREARM

## 2022-02-09 ASSESSMENT — LOCATION ZONE DERM
LOCATION ZONE: ARM
LOCATION ZONE: TRUNK
LOCATION ZONE: LEG
LOCATION ZONE: FACE
LOCATION ZONE: NECK

## 2022-02-09 NOTE — PROCEDURE: COUNSELING
Detail Level: Generalized
Sunscreen Recommendations: SPF of 30 or higher
Detail Level: Zone
Detail Level: Detailed

## 2022-02-09 NOTE — PROCEDURE: TREATMENT REGIMEN
Continue Regimen: Moisturize daily
Plan: Recommend patient to wear a broad spectrum of SPF
Detail Level: Zone

## 2022-02-09 NOTE — PROCEDURE: MIPS QUALITY
Quality 155 (Denominator): Falls Plan Of Care: Plan of Care not Documented, Reason not Otherwise Specified
Quality 111:Pneumonia Vaccination Status For Older Adults: Pneumococcal Vaccination not Administered or Previously Received, Reason not Otherwise Specified
Quality 226: Preventive Care And Screening: Tobacco Use: Screening And Cessation Intervention: Patient screened for tobacco use and is an ex/non-smoker
Detail Level: Detailed
Quality 431: Preventive Care And Screening: Unhealthy Alcohol Use - Screening: Patient screened for unhealthy alcohol use using a single question and scores less than 2 times per year
Quality 110: Preventive Care And Screening: Influenza Immunization: Influenza Immunization previously received during influenza season
Quality 154 Part B: Falls: Risk Screening (Should Be Reported With Measure 155.): Patient screened for future fall risk; documentation of no falls in the past year or only one fall without injury in the past year
Quality 402: Tobacco Use And Help With Quitting Among Adolescents: Patient screened for tobacco and never smoked
Quality 131: Pain Assessment And Follow-Up: Pain assessment using a standardized tool is documented as negative, no follow-up plan required
Quality 154 Part A: Falls: Risk Assessment (Should Be Reported With Measure 155.): Falls risk assessment completed and documented in the past 12 months.

## 2022-02-09 NOTE — PROCEDURE: BENIGN DESTRUCTION
Consent: The patient's consent was obtained including but not limited to risks of crusting, scabbing, blistering, scarring, darker or lighter pigmentary change, recurrence, incomplete removal and infection.
Medical Necessity Clause: This procedure was medically necessary because the lesions that were treated were:
Post-Care Instructions: I reviewed with the patient in detail post-care instructions. Patient is to wear sunprotection, and avoid picking at any of the treated lesions. Pt may apply Vaseline to crusted or scabbing areas.
Medical Necessity Information: It is in your best interest to select a reason for this procedure from the list below. All of these items fulfill various CMS LCD requirements except the new and changing color options.
Detail Level: Detailed
Render Note In Bullet Format When Appropriate: No
Anesthesia Volume In Cc: 0.5

## 2023-09-09 NOTE — PLAN OF CARE
Problem: Patient Care Overview  Goal: Plan of Care Review  Outcome: Ongoing (interventions implemented as appropriate)  Pt free of falls/traumas/injuries. Skin remains clean, dry, and intact. Pt remains chest pain free, nitro patch to left chest wall. Pt given CABG handouts on procedure.   Pt re-educated on importance of measuring accurate intake and out put; pt verbalized and demonstrates understanding. Reviewed plan of care with pt; and pt verbalized understanding.  Pt VSS in no distress will continue to monitor.        Regarding: [place] JASMIN 57, having shortness of breath at pharmacy to get inhaler no refills  ----- Message from Yadiel Mendez sent at 9/9/2023  9:45 AM CDT -----  Patient Name: Grzegorz Lion  Caller: Patient  Call Back # : [phone]    Is this for an Active episode for Home Health, Hospice or Palliative Care? No   Symptomatic: Yes   Specialist or PCP Name: Dr Mukesh Souza  Symptoms: [place] M 57, having shortness of breath at pharmacy to get inhaler no refills  Pregnant (females aged 13-60. If Yes, how long?) : no  Name of Clinic Site / Acct# :     Use following scripting for patients waiting for a callback:   \"Nurse callback times vary based on call volumes; please be aware the return phone call may come from an unidentified or out of state phone number. If your symptoms worsen or become life threatening while waiting, you should seek immediate assistance by calling 911 or going to the ER for evaluation.\"          Are you currently at (or near) your place of residence? No City:  State:  Zip:

## (undated) DEVICE — TRAY HEART

## (undated) DEVICE — BANDAGE ACE ELASTIC 6"

## (undated) DEVICE — SUT 2/0 36IN COATED VICRYL

## (undated) DEVICE — DRAPE SLUSH WARMER WITH DISC

## (undated) DEVICE — Device

## (undated) DEVICE — SUT 6 18IN STEEL MONO CCS

## (undated) DEVICE — BLADE ELECTRO EDGE INSULATED

## (undated) DEVICE — NDL 18GA X1 1/2 REG BEVEL

## (undated) DEVICE — DRESSING TELFA STRL 4X3 LF

## (undated) DEVICE — TUBING HF INSUFFLATION W/ FLTR

## (undated) DEVICE — SUT PROLENE 4-0 RB-1 BL MO

## (undated) DEVICE — BLADE 4IN EDGE INSULATED

## (undated) DEVICE — DRAIN CHEST DRY SUCTION

## (undated) DEVICE — SUT PROLENE 4-0 SH BLU 36IN

## (undated) DEVICE — ELECTRODE REM PLYHSV RETURN 9

## (undated) DEVICE — SEE MEDLINE ITEM 157117

## (undated) DEVICE — PAD K-THERMIA 24IN X 60IN

## (undated) DEVICE — PROBE CATH TEMP 16 FRFOLEY 400

## (undated) DEVICE — BLADE BEAVER 15 DEG 1.5MM

## (undated) DEVICE — DRESSING ADH ISLAND 3.6 X 14

## (undated) DEVICE — TRAY CATH UM FOLEY SIL W 16FR

## (undated) DEVICE — PLEDGET SUT SOFT 3/8X3/16X1/16

## (undated) DEVICE — SEE MEDLINE ITEM 152515

## (undated) DEVICE — SEE MEDLINE ITEM 146417

## (undated) DEVICE — SYS PRINEO SKIN CLOSURE

## (undated) DEVICE — GAUZE SPONGE 4X4 12PLY

## (undated) DEVICE — CANNULA VESSEL FREE FLOW

## (undated) DEVICE — SUT VICRYL 3-0 27 SH

## (undated) DEVICE — DRAPE SPLIT STERILE

## (undated) DEVICE — CLOSURE SKIN STERI STRIP 1/2X4

## (undated) DEVICE — SUT SILK 2-0 SH 18IN BLACK

## (undated) DEVICE — SET DECANTER MEDICHOICE

## (undated) DEVICE — SUT PROLENE 7-0 BV-1 30

## (undated) DEVICE — TAPE CLOTH SOFT MEDIPORE 4IN

## (undated) DEVICE — RETRACTOR OCTOBASE INSERT HOLD

## (undated) DEVICE — WIRE INTRAMYOCARDIAL TEMP

## (undated) DEVICE — SUT 2/0 30IN ETHIBOND

## (undated) DEVICE — DRESSING TRANS 4X4 TEGADERM

## (undated) DEVICE — INSERTS STEALTH FIBRA SIZE 5

## (undated) DEVICE — BLOWER MISTER

## (undated) DEVICE — SUT 4-0 12-18IN SILK BLACK

## (undated) DEVICE — SYR 3CC LUER LOC

## (undated) DEVICE — KIT SAHARA DRAPE DRAW/LIFT

## (undated) DEVICE — SUT 6/0 4-24IN PROLENE

## (undated) DEVICE — PUNCH AORTIC 4.0MM 6/CASE

## (undated) DEVICE — SUT VICRYL BR 1 GEN 27 CT-1

## (undated) DEVICE — DRESSING AQUACEL SACRAL 9 X 9

## (undated) DEVICE — SUT SILK BLK BR. 2 2-60

## (undated) DEVICE — CLIP SPRING 6MM

## (undated) DEVICE — SYS VIRTUOSAPH PLUS EVM